# Patient Record
Sex: FEMALE | Race: WHITE | NOT HISPANIC OR LATINO | URBAN - METROPOLITAN AREA
[De-identification: names, ages, dates, MRNs, and addresses within clinical notes are randomized per-mention and may not be internally consistent; named-entity substitution may affect disease eponyms.]

---

## 2017-03-17 ENCOUNTER — GENERIC CONVERSION - ENCOUNTER (OUTPATIENT)
Dept: OTHER | Facility: OTHER | Age: 47
End: 2017-03-17

## 2017-05-15 ENCOUNTER — ALLSCRIPTS OFFICE VISIT (OUTPATIENT)
Dept: OTHER | Facility: OTHER | Age: 47
End: 2017-05-15

## 2017-06-07 ENCOUNTER — ALLSCRIPTS OFFICE VISIT (OUTPATIENT)
Dept: OTHER | Facility: OTHER | Age: 47
End: 2017-06-07

## 2017-08-15 ENCOUNTER — ALLSCRIPTS OFFICE VISIT (OUTPATIENT)
Dept: OTHER | Facility: OTHER | Age: 47
End: 2017-08-15

## 2017-12-26 ENCOUNTER — ALLSCRIPTS OFFICE VISIT (OUTPATIENT)
Dept: OTHER | Facility: OTHER | Age: 47
End: 2017-12-26

## 2018-01-12 NOTE — MISCELLANEOUS
Dear  Rj Jensen: We missed you for your originally scheduled neurological followup appointment with Dr Chava Escobedo  Please call at your earliest convenience to reschedule this appointment  Sincerely,     Paolo Donato 89           Electronically signed by: Kayla Millard   Mar 17 2017 10:03AM EST Co-author

## 2018-01-12 NOTE — MISCELLANEOUS
Dear Eileen Israel : We missed you for your originally scheduled neurological followup appointment with Dr Cameron Fischer  Please call at your earliest convenience to reschedule this appointment  Sincerely,     Fredy Burger 102           Electronically signed by: Maribel Griffith   Nov 23 2016 11:11AM EST Co-author

## 2018-01-13 NOTE — PROCEDURES
Results/Data    Procedure: Electromyogram and Nerve Conduction Study  Indication: Right Upper Extremity   Referred by Dr Dalila Huang  The procedure's were discussed with the patient  Written consent was obtained prior to the procedure and is detailed in the patient's record  Prior to the start of the procedure a time out was taken and the identity of the patient was confirmed via name and date of birth with the patient  The correct site and the procedure to be performed were confirmed  The correct side was confirmed if applicable  The positioning of the patient was verified  The availability of the correct equipment was verified  Procedure Start Time: 11:30    Technique: A sterile concentric needle electrode was used  The patient tolerated the procedure well  There were no complications  Results : Motor and sensory nerve conduction studies were performed on the median and ulnar nerves  The median motor terminal latency was within normal limits with a normal compound motor action potential amplitude and a slow conduction velocity across the wrist  The ulnar compound motor action potential was within normal limits  The median and ulnar F waves were within normal limits  The median sensory peak latency was prolonged with a normal sensory action potential amplitude  The ulnar sensory action potential was within normal limits  The median palmar evoked response was prolonged by 0 6 as compared to the ulnar palmar evoked responses same distance  Concentric needle examination was performed on various proximal and distal muscles including deltoid, biceps, triceps, pronator teres, APB, FDI and low cervical paraspinals  There was no evidence of active denervation in any of the muscles tested  Mild decreased recruitment was noted in the APB   The compound motor unit action potentials were of normal configuration with interference patterns being full or full for effort in the remaining muscles tested  Interpretation: There is electrophysiologic  evidence of a:    1  Moderate median nerve compression neuropathy at the wrist with demyelinative  changes, consistent with a diagnosis of carpal tunnel syndrome  2  There is no evidence of a cervical radiculopathy or  ulnar neuropathy  Clinical correlation is recommended        Signatures   Electronically signed by : Ravin García MD; Jun 7 2017  1:24PM EST                       (Author)

## 2018-01-14 VITALS
BODY MASS INDEX: 22.67 KG/M2 | WEIGHT: 123.19 LBS | SYSTOLIC BLOOD PRESSURE: 106 MMHG | HEIGHT: 62 IN | DIASTOLIC BLOOD PRESSURE: 77 MMHG | HEART RATE: 86 BPM

## 2018-01-14 VITALS
DIASTOLIC BLOOD PRESSURE: 84 MMHG | WEIGHT: 120 LBS | HEART RATE: 80 BPM | BODY MASS INDEX: 22.08 KG/M2 | HEIGHT: 62 IN | SYSTOLIC BLOOD PRESSURE: 122 MMHG | RESPIRATION RATE: 16 BRPM
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
## 2018-01-23 NOTE — MISCELLANEOUS
Provider Comments  Provider Comments:   2nd time patient hasn't been with us for her appointment  Tried calling pt  with current number, but that number has been disconnected  Signatures   Electronically signed by :  Zhane Hendricks MD; Dec 26 2017  1:15PM EST                       (Author)

## 2018-10-16 ENCOUNTER — INPATIENT (INPATIENT)
Facility: HOSPITAL | Age: 48
LOS: 4 days | Discharge: HOME | End: 2018-10-21
Attending: INTERNAL MEDICINE | Admitting: INTERNAL MEDICINE

## 2018-10-16 VITALS
TEMPERATURE: 96 F | HEIGHT: 62 IN | SYSTOLIC BLOOD PRESSURE: 139 MMHG | HEART RATE: 88 BPM | DIASTOLIC BLOOD PRESSURE: 82 MMHG | RESPIRATION RATE: 16 BRPM | WEIGHT: 117.07 LBS

## 2018-10-16 DIAGNOSIS — F19.10 OTHER PSYCHOACTIVE SUBSTANCE ABUSE, UNCOMPLICATED: ICD-10-CM

## 2018-10-16 DIAGNOSIS — F13.20 SEDATIVE, HYPNOTIC OR ANXIOLYTIC DEPENDENCE, UNCOMPLICATED: ICD-10-CM

## 2018-10-16 DIAGNOSIS — Z98.1 ARTHRODESIS STATUS: Chronic | ICD-10-CM

## 2018-10-16 DIAGNOSIS — F13.10 SEDATIVE, HYPNOTIC OR ANXIOLYTIC ABUSE, UNCOMPLICATED: ICD-10-CM

## 2018-10-16 DIAGNOSIS — F10.20 ALCOHOL DEPENDENCE, UNCOMPLICATED: ICD-10-CM

## 2018-10-16 DIAGNOSIS — Z98.890 OTHER SPECIFIED POSTPROCEDURAL STATES: Chronic | ICD-10-CM

## 2018-10-16 DIAGNOSIS — M54.9 DORSALGIA, UNSPECIFIED: ICD-10-CM

## 2018-10-16 DIAGNOSIS — F17.200 NICOTINE DEPENDENCE, UNSPECIFIED, UNCOMPLICATED: ICD-10-CM

## 2018-10-16 DIAGNOSIS — F41.9 ANXIETY DISORDER, UNSPECIFIED: ICD-10-CM

## 2018-10-16 DIAGNOSIS — F14.20 COCAINE DEPENDENCE, UNCOMPLICATED: ICD-10-CM

## 2018-10-16 DIAGNOSIS — F11.20 OPIOID DEPENDENCE, UNCOMPLICATED: ICD-10-CM

## 2018-10-16 LAB
ALBUMIN SERPL ELPH-MCNC: 4.2 G/DL — SIGNIFICANT CHANGE UP (ref 3.5–5.2)
ALP SERPL-CCNC: 97 U/L — SIGNIFICANT CHANGE UP (ref 30–115)
ALT FLD-CCNC: 7 U/L — SIGNIFICANT CHANGE UP (ref 0–41)
AMMONIA BLD-MCNC: 31 UMOL/L — SIGNIFICANT CHANGE UP (ref 11–55)
AMYLASE P1 CFR SERPL: 45 U/L — SIGNIFICANT CHANGE UP (ref 25–115)
ANION GAP SERPL CALC-SCNC: 16 MMOL/L — HIGH (ref 7–14)
APPEARANCE UR: CLEAR — SIGNIFICANT CHANGE UP
AST SERPL-CCNC: 9 U/L — SIGNIFICANT CHANGE UP (ref 0–41)
BACTERIA # UR AUTO: ABNORMAL
BASOPHILS # BLD AUTO: 0.09 K/UL — SIGNIFICANT CHANGE UP (ref 0–0.2)
BASOPHILS NFR BLD AUTO: 0.9 % — SIGNIFICANT CHANGE UP (ref 0–1)
BILIRUB SERPL-MCNC: <0.2 MG/DL — SIGNIFICANT CHANGE UP (ref 0.2–1.2)
BILIRUB UR-MCNC: NEGATIVE — SIGNIFICANT CHANGE UP
BUN SERPL-MCNC: 12 MG/DL — SIGNIFICANT CHANGE UP (ref 10–20)
CALCIUM SERPL-MCNC: 9.1 MG/DL — SIGNIFICANT CHANGE UP (ref 8.5–10.1)
CHLORIDE SERPL-SCNC: 102 MMOL/L — SIGNIFICANT CHANGE UP (ref 98–110)
CHOLEST SERPL-MCNC: 157 MG/DL — SIGNIFICANT CHANGE UP (ref 100–200)
CO2 SERPL-SCNC: 26 MMOL/L — SIGNIFICANT CHANGE UP (ref 17–32)
COD CRY URNS QL: NEGATIVE — SIGNIFICANT CHANGE UP
COLOR SPEC: YELLOW — SIGNIFICANT CHANGE UP
CREAT SERPL-MCNC: 1 MG/DL — SIGNIFICANT CHANGE UP (ref 0.7–1.5)
DIFF PNL FLD: ABNORMAL
EOSINOPHIL # BLD AUTO: 0.15 K/UL — SIGNIFICANT CHANGE UP (ref 0–0.7)
EOSINOPHIL NFR BLD AUTO: 1.6 % — SIGNIFICANT CHANGE UP (ref 0–8)
EPI CELLS # UR: ABNORMAL /HPF
ETHANOL SERPL-MCNC: 13 MG/DL — HIGH
GGT SERPL-CCNC: 22 U/L — SIGNIFICANT CHANGE UP (ref 1–40)
GLUCOSE SERPL-MCNC: 122 MG/DL — HIGH (ref 70–99)
GLUCOSE UR QL: NEGATIVE MG/DL — SIGNIFICANT CHANGE UP
GRAN CASTS # UR COMP ASSIST: NEGATIVE — SIGNIFICANT CHANGE UP
HCG UR QL: NEGATIVE — SIGNIFICANT CHANGE UP
HCT VFR BLD CALC: 39.1 % — SIGNIFICANT CHANGE UP (ref 37–47)
HDLC SERPL-MCNC: 35 MG/DL — LOW
HGB BLD-MCNC: 13.2 G/DL — SIGNIFICANT CHANGE UP (ref 12–16)
HYALINE CASTS # UR AUTO: NEGATIVE — SIGNIFICANT CHANGE UP
IMM GRANULOCYTES NFR BLD AUTO: 0.3 % — SIGNIFICANT CHANGE UP (ref 0.1–0.3)
KETONES UR-MCNC: NEGATIVE — SIGNIFICANT CHANGE UP
LEUKOCYTE ESTERASE UR-ACNC: ABNORMAL
LIPID PNL WITH DIRECT LDL SERPL: 99 MG/DL — SIGNIFICANT CHANGE UP (ref 4–129)
LYMPHOCYTES # BLD AUTO: 4.02 K/UL — HIGH (ref 1.2–3.4)
LYMPHOCYTES # BLD AUTO: 42.2 % — SIGNIFICANT CHANGE UP (ref 20.5–51.1)
MAGNESIUM SERPL-MCNC: 2.2 MG/DL — SIGNIFICANT CHANGE UP (ref 1.8–2.4)
MCHC RBC-ENTMCNC: 30.8 PG — SIGNIFICANT CHANGE UP (ref 27–31)
MCHC RBC-ENTMCNC: 33.8 G/DL — SIGNIFICANT CHANGE UP (ref 32–37)
MCV RBC AUTO: 91.1 FL — SIGNIFICANT CHANGE UP (ref 81–99)
MONOCYTES # BLD AUTO: 0.47 K/UL — SIGNIFICANT CHANGE UP (ref 0.1–0.6)
MONOCYTES NFR BLD AUTO: 4.9 % — SIGNIFICANT CHANGE UP (ref 1.7–9.3)
NEUTROPHILS # BLD AUTO: 4.77 K/UL — SIGNIFICANT CHANGE UP (ref 1.4–6.5)
NEUTROPHILS NFR BLD AUTO: 50.1 % — SIGNIFICANT CHANGE UP (ref 42.2–75.2)
NITRITE UR-MCNC: NEGATIVE — SIGNIFICANT CHANGE UP
NRBC # BLD: 0 /100 WBCS — SIGNIFICANT CHANGE UP (ref 0–0)
PH UR: 7 — SIGNIFICANT CHANGE UP (ref 5–8)
PLAT MORPH BLD: NORMAL — SIGNIFICANT CHANGE UP
PLATELET # BLD AUTO: 394 K/UL — SIGNIFICANT CHANGE UP (ref 130–400)
POTASSIUM SERPL-MCNC: 4.1 MMOL/L — SIGNIFICANT CHANGE UP (ref 3.5–5)
POTASSIUM SERPL-SCNC: 4.1 MMOL/L — SIGNIFICANT CHANGE UP (ref 3.5–5)
PROT SERPL-MCNC: 6.7 G/DL — SIGNIFICANT CHANGE UP (ref 6–8)
PROT UR-MCNC: NEGATIVE MG/DL — SIGNIFICANT CHANGE UP
RBC # BLD: 4.29 M/UL — SIGNIFICANT CHANGE UP (ref 4.2–5.4)
RBC # FLD: 13.2 % — SIGNIFICANT CHANGE UP (ref 11.5–14.5)
RBC BLD AUTO: NORMAL — SIGNIFICANT CHANGE UP
RBC CASTS # UR COMP ASSIST: ABNORMAL /HPF
SODIUM SERPL-SCNC: 144 MMOL/L — SIGNIFICANT CHANGE UP (ref 135–146)
SP GR SPEC: 1.02 — SIGNIFICANT CHANGE UP (ref 1.01–1.03)
TOTAL CHOLESTEROL/HDL RATIO MEASUREMENT: 4.5 RATIO — SIGNIFICANT CHANGE UP (ref 4–5.5)
TRI-PHOS CRY UR QL COMP ASSIST: NEGATIVE — SIGNIFICANT CHANGE UP
TRIGL SERPL-MCNC: 161 MG/DL — HIGH (ref 10–149)
URATE CRY FLD QL MICRO: NEGATIVE — SIGNIFICANT CHANGE UP
UROBILINOGEN FLD QL: 0.2 MG/DL — SIGNIFICANT CHANGE UP (ref 0.2–0.2)
WBC # BLD: 9.53 K/UL — SIGNIFICANT CHANGE UP (ref 4.8–10.8)
WBC # FLD AUTO: 9.53 K/UL — SIGNIFICANT CHANGE UP (ref 4.8–10.8)
WBC UR QL: SIGNIFICANT CHANGE UP /HPF

## 2018-10-16 RX ORDER — METHOCARBAMOL 500 MG/1
500 TABLET, FILM COATED ORAL EVERY 6 HOURS
Qty: 0 | Refills: 0 | Status: DISCONTINUED | OUTPATIENT
Start: 2018-10-16 | End: 2018-10-21

## 2018-10-16 RX ORDER — MAGNESIUM HYDROXIDE 400 MG/1
30 TABLET, CHEWABLE ORAL ONCE
Qty: 0 | Refills: 0 | Status: DISCONTINUED | OUTPATIENT
Start: 2018-10-16 | End: 2018-10-21

## 2018-10-16 RX ORDER — PHENOBARBITAL 60 MG
TABLET ORAL
Qty: 0 | Refills: 0 | Status: COMPLETED | OUTPATIENT
Start: 2018-10-16 | End: 2018-10-21

## 2018-10-16 RX ORDER — IBUPROFEN 200 MG
600 TABLET ORAL EVERY 6 HOURS
Qty: 0 | Refills: 0 | Status: DISCONTINUED | OUTPATIENT
Start: 2018-10-16 | End: 2018-10-21

## 2018-10-16 RX ORDER — FOLIC ACID 0.8 MG
1 TABLET ORAL DAILY
Qty: 0 | Refills: 0 | Status: DISCONTINUED | OUTPATIENT
Start: 2018-10-16 | End: 2018-10-21

## 2018-10-16 RX ORDER — THIAMINE MONONITRATE (VIT B1) 100 MG
100 TABLET ORAL DAILY
Qty: 0 | Refills: 0 | Status: COMPLETED | OUTPATIENT
Start: 2018-10-16 | End: 2018-10-19

## 2018-10-16 RX ORDER — PHENOBARBITAL 60 MG
32.4 TABLET ORAL EVERY 4 HOURS
Qty: 0 | Refills: 0 | Status: DISCONTINUED | OUTPATIENT
Start: 2018-10-16 | End: 2018-10-21

## 2018-10-16 RX ORDER — ACETAMINOPHEN 500 MG
650 TABLET ORAL EVERY 4 HOURS
Qty: 0 | Refills: 0 | Status: DISCONTINUED | OUTPATIENT
Start: 2018-10-16 | End: 2018-10-21

## 2018-10-16 RX ORDER — PHENOBARBITAL 60 MG
32.4 TABLET ORAL EVERY 6 HOURS
Qty: 0 | Refills: 0 | Status: DISCONTINUED | OUTPATIENT
Start: 2018-10-18 | End: 2018-10-19

## 2018-10-16 RX ORDER — MULTIVIT-MIN/FERROUS GLUCONATE 9 MG/15 ML
1 LIQUID (ML) ORAL DAILY
Qty: 0 | Refills: 0 | Status: DISCONTINUED | OUTPATIENT
Start: 2018-10-16 | End: 2018-10-21

## 2018-10-16 RX ORDER — PHENOBARBITAL 60 MG
48.6 TABLET ORAL EVERY 6 HOURS
Qty: 0 | Refills: 0 | Status: DISCONTINUED | OUTPATIENT
Start: 2018-10-17 | End: 2018-10-17

## 2018-10-16 RX ORDER — PHENOBARBITAL 60 MG
32.4 TABLET ORAL EVERY 12 HOURS
Qty: 0 | Refills: 0 | Status: DISCONTINUED | OUTPATIENT
Start: 2018-10-20 | End: 2018-10-21

## 2018-10-16 RX ORDER — PSEUDOEPHEDRINE HCL 30 MG
60 TABLET ORAL EVERY 6 HOURS
Qty: 0 | Refills: 0 | Status: DISCONTINUED | OUTPATIENT
Start: 2018-10-16 | End: 2018-10-21

## 2018-10-16 RX ORDER — TRAZODONE HCL 50 MG
100 TABLET ORAL AT BEDTIME
Qty: 0 | Refills: 0 | Status: DISCONTINUED | OUTPATIENT
Start: 2018-10-16 | End: 2018-10-21

## 2018-10-16 RX ORDER — HYDROXYZINE HCL 10 MG
50 TABLET ORAL EVERY 6 HOURS
Qty: 0 | Refills: 0 | Status: DISCONTINUED | OUTPATIENT
Start: 2018-10-16 | End: 2018-10-21

## 2018-10-16 RX ORDER — PHENOBARBITAL 60 MG
64.8 TABLET ORAL EVERY 6 HOURS
Qty: 0 | Refills: 0 | Status: DISCONTINUED | OUTPATIENT
Start: 2018-10-16 | End: 2018-10-16

## 2018-10-16 RX ORDER — NICOTINE POLACRILEX 2 MG
1 GUM BUCCAL DAILY
Qty: 0 | Refills: 0 | Status: DISCONTINUED | OUTPATIENT
Start: 2018-10-16 | End: 2018-10-21

## 2018-10-16 RX ADMIN — Medication 64.8 MILLIGRAM(S): at 18:42

## 2018-10-16 RX ADMIN — Medication 64.8 MILLIGRAM(S): at 23:03

## 2018-10-16 NOTE — H&P ADULT - HISTORY OF PRESENT ILLNESS
47y Female presents for detox with continuous Opioid use disorder & Benzodiazepine use disorder. Patient reports she was cleaned for 11 years since , until she sister  from cancer and she was so upset and triggered her to go back to alcohol and cocaine abuse. Pt also bought street Xanax 3x weekly. Denies h/o seizure. Pt reports having "delusion" in the past 2 weeks, seeing auras and souls. Denies h/o other episodes of AVH. Pt says it only happened in the past 2 weeks.  Patient c/o feeling anxiety, insomnia, poor appetite, hot and chills intermittently and tremors. Chronic lower back pain with pain score 8 out 10.Patient A&Ox3, denies cp, sob, headache, dizziness, bleeding and dysuria.  LMP: 3 months ago, Papsmear in  was normal, Mammography: Never  Patient admits to abusing current substances as follows:  DRUG	AGE OF ONSET (Y.O)	ROUTE	FREQ	AMOUNT	LAST USE	LENGTH OF CURRENT USE	  ETOH	17	PO	Daily	6 beers + 2 airplane bottle size of liquor	10/16/18 24 oz + 2 shots	1 year	  Xanax  	31	PO	3x /week	4mg	10/16/18 4mg	3 month	  Crack	22	IN/SMOKING	Daily	$40	1 week ago	1 year	  Heroin	47	IN	One time	1 line	1 week ago	One time	  THC	13	Smoking	Daily	4 joints	10/16/18 4 joints		  Crytal meth	47	Smoking	3 times only	?	10/13/18	3 times	  Denies other drugs abuse							  Last Detox:  at [facility] Withdrawal Seizures: Denied  Psyhx: Anxiety and depression, Denies h/o suicidal attempt or thought. Pt [facility]  due to "emotional breakdown", H/O visual hallucination in the past 2 weeks, "I saw rainbow and soul". Denies auditory hallucination. pt reports possible from drug/etoh induced.   Is patient currently receiving methadone from an MMTP: No  Screening history	Last tested	Result	History of treatment	  HIV	? years	NEG	N/A	  Hepatitis C	//	NEG	N/A	  Quantiferon GOLD TB test		NEG	N/A	    Immunization	Not Received	Unknown	Received	Date Received 	  Influenza	v				  Pneumococcus	v				  Tetanus			v		  Others					  					  I-Stop:  Patient Name:	QUINTON ADAMS	YOB: 1970	  Address:	[address] [place] [id number]	Sex:	Female	  Rx Written	Rx Dispensed	Drug	Strength	Quantity	Days Supply	Prescriber Name	  2016	[id number]-[id number] -40		40.0	20	MD GRIFFIN, MARTIN	  2016	ZGFNFL-[id number] -40		40.0	20	MD GRIFFIN, MARTIN

## 2018-10-16 NOTE — H&P ADULT - ASSESSMENT
47y Female presents for detox with continuous Opioid use disorder & Benzodiazepine use disorder. Patient reports she was cleaned for 11 years since , until she sister  from cancer and she was so upset and triggered her to go back to alcohol and cocaine abuse. Pt also bought street Xanax 3x weekly. Denies h/o seizure. Pt reports having "delusion" in the past 2 weeks, seeing auras and souls. Denies h/o other episodes of AVH. Pt says it only happened in the past 2 weeks.

## 2018-10-16 NOTE — H&P ADULT - FAMILY HISTORY
Sibling  Still living? No  Family history of lung cancer, Age at diagnosis: Age Unknown     Mother  Still living? No  HTN (hypertension), Age at diagnosis: Age Unknown     Father  Still living? No  Family history of hypertension, Age at diagnosis: Age Unknown

## 2018-10-16 NOTE — H&P ADULT - PROBLEM SELECTOR PLAN 6
observation  Atarax 50mg PO Q6H PRN for anxiety  trazodone 100mg PO QHS PRN for insomnia  psych consult

## 2018-10-16 NOTE — H&P ADULT - ATTENDING COMMENTS
Patient interviewed and examined.    Chart reviewed.    PA's H&P noted and modified, as appropriate.    Case discussed on team rounds    Following is my summary of the case.    Admitted for detox: from ____ED, x___Intake, ____Med/Surg Floor    Alcohol__x__   Opioid_____  Benzo_x__ Other_____    Substance amount, duration of use, last usage, and prior attempts at detox or rehabs, are outlined above in the H&P and discussed with patient.    Associated withdrawal symptoms presents.  Comorbid conditions noted. Chronic and Stable.    Past Medical Hx, Psych Hx, family Hx, Social Hx from H&P reviewed and NO changes.  MDD(+) YUMIKO(+)_    Old medical record and medication Hx. Reviewed    Following items reviewed and addressed:  1. labs  2. EKG  3. Imaging from PACs module    Examination: no change from PA's exam.    Place on following protocol  _____Medically Managed  __X__Medically Supervised    Ciwa_____Librium taper____Ativan taper___Methadone taper___ Phenobarb taper_x___ Suboxone Induction____MMTP____    Narcan Kit Offered    Psych Consult ____N/A  _X__Ordered    Physical Therapy  ___X N/A   ___  Ordered    Aftercare disposition to be addressed by counselors.    Estimated length of stay 3-5 days.

## 2018-10-16 NOTE — H&P ADULT - NSHPPHYSICALEXAM_GEN_ALL_CORE
-  Vital Signs:      Temp: 98.2      Pulse: 100        RR: 14       BP:  90/70    Physical Exam:              Constitutional: +Anxious, appear depressed,  A&Ox3, W/N and W/D.  HEENT: NC/AT, PERRLA, EOM Intact, Nares normal, No Sinus tenderness.  Lips, mucosa and tongue normal; Neck supple, No adenopathy  Respiratory: CTAB, no rales, no rhonchi, no wheezes  Cardiovascular: +S1S2, No M/R/G  Gastrointestinal: +BS, soft, non-tender, not distended, No CVAT  Extremities: Atraumatic, no cyanosis, no edema, no calf tenderness,  Vascular: +dorsal pedis and radial pulses, no extremity cyanosis  Neurological: sensation intact, ROM equal B/L, CN II-XII intact, Gait: steady  Skin: no rashes, no lesions, normal turgor, No track marks  No Decubiti present  No IV lines present  Rectal/Breasts Exam: Deferred

## 2018-10-17 DIAGNOSIS — F13.20 SEDATIVE, HYPNOTIC OR ANXIOLYTIC DEPENDENCE, UNCOMPLICATED: ICD-10-CM

## 2018-10-17 DIAGNOSIS — F10.20 ALCOHOL DEPENDENCE, UNCOMPLICATED: ICD-10-CM

## 2018-10-17 LAB
AMPHET UR-MCNC: POSITIVE
BARBITURATES UR SCN-MCNC: NEGATIVE — SIGNIFICANT CHANGE UP
BENZODIAZ UR-MCNC: POSITIVE
COCAINE METAB.OTHER UR-MCNC: NEGATIVE — SIGNIFICANT CHANGE UP
ESTIMATED AVERAGE GLUCOSE: 123 MG/DL — HIGH (ref 68–114)
HAV IGM SER-ACNC: SIGNIFICANT CHANGE UP
HBA1C BLD-MCNC: 5.9 % — HIGH (ref 4–5.6)
HBV CORE IGM SER-ACNC: SIGNIFICANT CHANGE UP
HBV SURFACE AG SER-ACNC: SIGNIFICANT CHANGE UP
HCV AB S/CO SERPL IA: 0.18 S/CO — SIGNIFICANT CHANGE UP
HCV AB SERPL-IMP: SIGNIFICANT CHANGE UP
HIV 1+2 AB+HIV1 P24 AG SERPL QL IA: SIGNIFICANT CHANGE UP
METHADONE UR-MCNC: NEGATIVE — SIGNIFICANT CHANGE UP
OPIATES UR-MCNC: NEGATIVE — SIGNIFICANT CHANGE UP
OXYCODONE UR-MCNC: NEGATIVE — SIGNIFICANT CHANGE UP
PCP SPEC-MCNC: SIGNIFICANT CHANGE UP
PROPOXYPHENE QUALITATIVE URINE RESULT: NEGATIVE — SIGNIFICANT CHANGE UP
T PALLIDUM AB TITR SER: NEGATIVE — SIGNIFICANT CHANGE UP

## 2018-10-17 RX ORDER — PANTOPRAZOLE SODIUM 20 MG/1
40 TABLET, DELAYED RELEASE ORAL
Qty: 0 | Refills: 0 | Status: DISCONTINUED | OUTPATIENT
Start: 2018-10-17 | End: 2018-10-21

## 2018-10-17 RX ORDER — SERTRALINE 25 MG/1
50 TABLET, FILM COATED ORAL DAILY
Qty: 0 | Refills: 0 | Status: DISCONTINUED | OUTPATIENT
Start: 2018-10-17 | End: 2018-10-19

## 2018-10-17 RX ADMIN — PANTOPRAZOLE SODIUM 40 MILLIGRAM(S): 20 TABLET, DELAYED RELEASE ORAL at 11:44

## 2018-10-17 RX ADMIN — Medication 1 TABLET(S): at 09:01

## 2018-10-17 RX ADMIN — Medication 48.6 MILLIGRAM(S): at 17:29

## 2018-10-17 RX ADMIN — SERTRALINE 50 MILLIGRAM(S): 25 TABLET, FILM COATED ORAL at 11:47

## 2018-10-17 RX ADMIN — Medication 48.6 MILLIGRAM(S): at 06:27

## 2018-10-17 RX ADMIN — Medication 1 MILLIGRAM(S): at 09:01

## 2018-10-17 RX ADMIN — Medication 1 PATCH: at 09:01

## 2018-10-17 RX ADMIN — Medication 100 MILLIGRAM(S): at 09:01

## 2018-10-17 RX ADMIN — Medication 48.6 MILLIGRAM(S): at 11:43

## 2018-10-17 NOTE — BEHAVIORAL HEALTH ASSESSMENT NOTE - NSBHSOCIALHXDETAILSFT_PSY_A_CORE
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

## 2018-10-17 NOTE — BEHAVIORAL HEALTH ASSESSMENT NOTE - SUMMARY
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 Pt reports depressive sx recently, grief from losses in past year. Pt reports hallucinations and delusions but has insight that this was due to substance use and sleep deprivation and even laughs about it during assessment. Pt denies SI and agrees to start SSRI.

## 2018-10-17 NOTE — BEHAVIORAL HEALTH ASSESSMENT NOTE - LEGAL HISTORY
unsure of charge but is 4 count felony no details provided, drug possession charge, assaulting EMT charge
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
## 2018-10-18 LAB
GAMMA INTERFERON BACKGROUND BLD IA-ACNC: 0.02 IU/ML — SIGNIFICANT CHANGE UP
M TB IFN-G BLD-IMP: NEGATIVE — SIGNIFICANT CHANGE UP
M TB IFN-G CD4+ BCKGRND COR BLD-ACNC: 0 IU/ML — SIGNIFICANT CHANGE UP
M TB IFN-G CD4+CD8+ BCKGRND COR BLD-ACNC: 0 IU/ML — SIGNIFICANT CHANGE UP
QUANT TB PLUS MITOGEN MINUS NIL: >10 IU/ML — SIGNIFICANT CHANGE UP

## 2018-10-18 RX ADMIN — Medication 32.4 MILLIGRAM(S): at 11:07

## 2018-10-18 RX ADMIN — Medication 1 PATCH: at 11:06

## 2018-10-18 RX ADMIN — Medication 1 PATCH: at 11:02

## 2018-10-18 RX ADMIN — SERTRALINE 50 MILLIGRAM(S): 25 TABLET, FILM COATED ORAL at 11:02

## 2018-10-18 RX ADMIN — Medication 32.4 MILLIGRAM(S): at 18:38

## 2018-10-18 RX ADMIN — PANTOPRAZOLE SODIUM 40 MILLIGRAM(S): 20 TABLET, DELAYED RELEASE ORAL at 06:22

## 2018-10-18 RX ADMIN — Medication 1 PATCH: at 06:29

## 2018-10-18 RX ADMIN — Medication 32.4 MILLIGRAM(S): at 06:23

## 2018-10-19 RX ORDER — SERTRALINE 25 MG/1
100 TABLET, FILM COATED ORAL DAILY
Qty: 0 | Refills: 0 | Status: COMPLETED | OUTPATIENT
Start: 2018-10-20 | End: 2018-10-21

## 2018-10-19 RX ADMIN — Medication 1 PATCH: at 08:48

## 2018-10-19 RX ADMIN — Medication 1 PATCH: at 08:43

## 2018-10-19 RX ADMIN — Medication 32.4 MILLIGRAM(S): at 12:44

## 2018-10-19 RX ADMIN — Medication 32.4 MILLIGRAM(S): at 18:10

## 2018-10-19 RX ADMIN — Medication 1 PATCH: at 08:47

## 2018-10-19 RX ADMIN — Medication 32.4 MILLIGRAM(S): at 06:14

## 2018-10-19 RX ADMIN — METHOCARBAMOL 500 MILLIGRAM(S): 500 TABLET, FILM COATED ORAL at 16:15

## 2018-10-19 RX ADMIN — SERTRALINE 50 MILLIGRAM(S): 25 TABLET, FILM COATED ORAL at 08:43

## 2018-10-19 RX ADMIN — PANTOPRAZOLE SODIUM 40 MILLIGRAM(S): 20 TABLET, DELAYED RELEASE ORAL at 06:15

## 2018-10-19 RX ADMIN — Medication 32.4 MILLIGRAM(S): at 00:24

## 2018-10-19 NOTE — CHART NOTE - NSCHARTNOTEFT_GEN_A_CORE
Called by RN who spoke with attending:  Patient to receive 50mg Zoloft today then 100mg 10/20 and 150mg 10/21.  orders placed in SCM for 10/20 and 10/21

## 2018-10-19 NOTE — CHART NOTE - NSCHARTNOTEFT_GEN_A_CORE
Subsequent Inpatient Encounter                                       Detox Unit    QUINTON ADAMS   47y   Female      Chief Complaint:    Follow up for Polysubstance  Dependency    HPI:     I reviewed previous notes. No Change, except if noted below.             Detail:_    ROS:   I reviewed with patient.  No changes from previous notes except if noted below.             Detail: _    PFSH I reviewed with patient. No changes from previous notes except if noted below.             Detail_    Medication reconciliation performed.    MEDICATIONS  (STANDING):  folic acid 1 milliGRAM(s) Oral daily  multivitamin/minerals 1 Tablet(s) Oral daily  nicotine - 21 mG/24Hr(s) Patch 1 Patch Transdermal daily  pantoprazole    Tablet 40 milliGRAM(s) Oral before breakfast  PHENobarbital 32.4 milliGRAM(s) Oral every 6 hours  PHENobarbital   Oral   sertraline 50 milliGRAM(s) Oral daily  thiamine 100 milliGRAM(s) Oral daily      MEDICATIONS  (PRN):  acetaminophen   Tablet .. 650 milliGRAM(s) Oral every 4 hours PRN Temp greater or equal to 38C (100.4F), Mild Pain (1 - 3)  aluminum hydroxide/magnesium hydroxide/simethicone Suspension 30 milliLiter(s) Oral every 6 hours PRN Heartburn  bismuth subsalicylate Liquid 30 milliLiter(s) Oral every 6 hours PRN Diarrhea  cloNIDine 0.1 milliGRAM(s) Oral every 8 hours PRN Blood Pressure GREATER THAN 140/90 mmHG  cloNIDine 0.1 milliGRAM(s) Oral every 8 hours PRN opiate withdrawal  guaiFENesin/dextromethorphan  Syrup 5 milliLiter(s) Oral every 4 hours PRN Cough  hydrOXYzine hydrochloride 50 milliGRAM(s) Oral every 6 hours PRN Anxiety  ibuprofen  Tablet. 600 milliGRAM(s) Oral every 6 hours PRN Mild Pain (1 - 3)  magnesium hydroxide Suspension 30 milliLiter(s) Oral once PRN Constipation  methocarbamol 500 milliGRAM(s) Oral every 6 hours PRN muscle pain  PHENobarbital 32.4 milliGRAM(s) Oral every 4 hours PRN Withdrawal  pseudoephedrine 60 milliGRAM(s) Oral every 6 hours PRN Rhinitis  traZODone 100 milliGRAM(s) Oral at bedtime PRN insomnia  trimethobenzamide 300 milliGRAM(s) Oral every 6 hours PRN Nausea and/or Vomiting  trimethobenzamide Injectable 200 milliGRAM(s) IntraMuscular every 6 hours PRN Nausea and/or Vomiting      T(C): 35.7 (10-19-18 @ 06:00), Max: 36.2 (10-18-18 @ 18:00)  HR: 70 (10-19-18 @ 06:00) (70 - 103)  BP: 112/67 (10-19-18 @ 06:00) (112/67 - 126/78)  RR: 14 (10-19-18 @ 06:00) (14 - 16)  SpO2: --    PHYSICAL EXAM:      Constitutional: NAD, A&O x3    Eyes: PERRLA, no conjuctivitis    Neck: no lymphadenopathy    Respiratory: +air entry, no rales, no rhonchi, no wheezes    Cardiovascular: +S1 and S2, regular rate and rhythm    Gastrointestinal: +BS, soft, non-tender, not distended    Extremities:  no edema, no calf tenderness    Skin: no rashes, normal turgor            Magnesium, Serum: 2.2 mg/dL (10-16-18 @ 19:26)  Ammonia, Serum: 31 umol/L (10-16-18 @ 19:26)  Amylase, Serum Total: 45 U/L (10-16-18 @ 19:26)  Hemoglobin A1C, Whole Blood: 5.9 % (10-16-18 @ 19:26)  Treponema Pallidum Antibody Interpretation: Negative (10-16-18 @ 19:26)  Hepatitis B Surface Antigen: Nonreact (10-16-18 @ 19:26)  Hepatitis C Virus S/CO Ratio: 0.18 S/CO (10-16-18 @ 19:26)    Hepatitis C Virus Interpretation: Nonreact (10-16-18 @ 19:26)            Impression and Plan:    Primary Diagnosis:  Benzo/EtOH Dependency                                Medication: Pheno Protocol    Secondary Diagnosis: MDD                                                          Medication: c/w Zoloft; Psych FU    Tertiary Diagnosis:                                                                                     Medication:      Continue Detox Protocols. Use of PRNS as needed for withdrawal and comfort.    Adjustments to protocols:    Labs/ Tests reviewed.    Tests ordered:     Likely Disposition: ___Home       ___Rehab       ___Outpatient Program    ___Self Help     _____Other    Estimated Length of stay:__5__

## 2018-10-20 RX ORDER — SERTRALINE 25 MG/1
1 TABLET, FILM COATED ORAL
Qty: 0 | Refills: 0 | COMMUNITY
Start: 2018-10-20

## 2018-10-20 RX ORDER — SERTRALINE 25 MG/1
100 TABLET, FILM COATED ORAL DAILY
Qty: 0 | Refills: 0 | Status: DISCONTINUED | OUTPATIENT
Start: 2018-10-20 | End: 2018-10-21

## 2018-10-20 RX ADMIN — Medication 32.4 MILLIGRAM(S): at 17:25

## 2018-10-20 RX ADMIN — Medication 100 MILLIGRAM(S): at 00:25

## 2018-10-20 RX ADMIN — METHOCARBAMOL 500 MILLIGRAM(S): 500 TABLET, FILM COATED ORAL at 21:04

## 2018-10-20 RX ADMIN — Medication 1 PATCH: at 20:22

## 2018-10-20 RX ADMIN — Medication 1 PATCH: at 08:59

## 2018-10-20 RX ADMIN — METHOCARBAMOL 500 MILLIGRAM(S): 500 TABLET, FILM COATED ORAL at 00:25

## 2018-10-20 RX ADMIN — Medication 100 MILLIGRAM(S): at 23:23

## 2018-10-20 RX ADMIN — SERTRALINE 100 MILLIGRAM(S): 25 TABLET, FILM COATED ORAL at 11:09

## 2018-10-20 RX ADMIN — PANTOPRAZOLE SODIUM 40 MILLIGRAM(S): 20 TABLET, DELAYED RELEASE ORAL at 06:15

## 2018-10-20 RX ADMIN — Medication 32.4 MILLIGRAM(S): at 05:53

## 2018-10-20 RX ADMIN — METHOCARBAMOL 500 MILLIGRAM(S): 500 TABLET, FILM COATED ORAL at 14:56

## 2018-10-20 RX ADMIN — Medication 1 PATCH: at 16:49

## 2018-10-20 RX ADMIN — Medication 50 MILLIGRAM(S): at 23:23

## 2018-10-20 NOTE — CHART NOTE - NSCHARTNOTEFT_GEN_A_CORE
Subsequent Inpatient Encounter                                       Detox Unit    QUINTON ADAMS   47y   Female      Chief Complaint:    Follow up for Polysubstance  Dependency    HPI:     I reviewed previous notes. No Change, except if noted below.             Detail:_    ROS:   I reviewed with patient.  No changes from previous notes except if noted below.             Detail: _    PFSH I reviewed with patient. No changes from previous notes except if noted below.             Detail_    Medication reconciliation performed.    MEDICATIONS  (STANDING):  folic acid 1 milliGRAM(s) Oral daily  multivitamin/minerals 1 Tablet(s) Oral daily  nicotine - 21 mG/24Hr(s) Patch 1 Patch Transdermal daily  pantoprazole    Tablet 40 milliGRAM(s) Oral before breakfast  PHENobarbital 32.4 milliGRAM(s) Oral every 6 hours  PHENobarbital   Oral   sertraline 50 milliGRAM(s) Oral daily  thiamine 100 milliGRAM(s) Oral daily      MEDICATIONS  (PRN):  acetaminophen   Tablet .. 650 milliGRAM(s) Oral every 4 hours PRN Temp greater or equal to 38C (100.4F), Mild Pain (1 - 3)  aluminum hydroxide/magnesium hydroxide/simethicone Suspension 30 milliLiter(s) Oral every 6 hours PRN Heartburn  bismuth subsalicylate Liquid 30 milliLiter(s) Oral every 6 hours PRN Diarrhea  cloNIDine 0.1 milliGRAM(s) Oral every 8 hours PRN Blood Pressure GREATER THAN 140/90 mmHG  cloNIDine 0.1 milliGRAM(s) Oral every 8 hours PRN opiate withdrawal  guaiFENesin/dextromethorphan  Syrup 5 milliLiter(s) Oral every 4 hours PRN Cough  hydrOXYzine hydrochloride 50 milliGRAM(s) Oral every 6 hours PRN Anxiety  ibuprofen  Tablet. 600 milliGRAM(s) Oral every 6 hours PRN Mild Pain (1 - 3)  magnesium hydroxide Suspension 30 milliLiter(s) Oral once PRN Constipation  methocarbamol 500 milliGRAM(s) Oral every 6 hours PRN muscle pain  PHENobarbital 32.4 milliGRAM(s) Oral every 4 hours PRN Withdrawal  pseudoephedrine 60 milliGRAM(s) Oral every 6 hours PRN Rhinitis  traZODone 100 milliGRAM(s) Oral at bedtime PRN insomnia  trimethobenzamide 300 milliGRAM(s) Oral every 6 hours PRN Nausea and/or Vomiting  trimethobenzamide Injectable 200 milliGRAM(s) IntraMuscular every 6 hours PRN Nausea and/or Vomiting      T(C): 35.7 (10-19-18 @ 06:00), Max: 36.2 (10-18-18 @ 18:00)  HR: 70 (10-19-18 @ 06:00) (70 - 103)  BP: 112/67 (10-19-18 @ 06:00) (112/67 - 126/78)  RR: 14 (10-19-18 @ 06:00) (14 - 16)  SpO2: --    PHYSICAL EXAM:      Constitutional: NAD, A&O x3    Eyes: PERRLA, no conjuctivitis    Neck: no lymphadenopathy    Respiratory: +air entry, no rales, no rhonchi, no wheezes    Cardiovascular: +S1 and S2, regular rate and rhythm    Gastrointestinal: +BS, soft, non-tender, not distended    Extremities:  no edema, no calf tenderness    Skin: no rashes, normal turgor            Magnesium, Serum: 2.2 mg/dL (10-16-18 @ 19:26)  Ammonia, Serum: 31 umol/L (10-16-18 @ 19:26)  Amylase, Serum Total: 45 U/L (10-16-18 @ 19:26)  Hemoglobin A1C, Whole Blood: 5.9 % (10-16-18 @ 19:26)  Treponema Pallidum Antibody Interpretation: Negative (10-16-18 @ 19:26)  Hepatitis B Surface Antigen: Nonreact (10-16-18 @ 19:26)  Hepatitis C Virus S/CO Ratio: 0.18 S/CO (10-16-18 @ 19:26)    Hepatitis C Virus Interpretation: Nonreact (10-16-18 @ 19:26)            Impression and Plan:    Primary Diagnosis:  Benzo/EtOH Dependency                                Medication: Pheno Protocol    Secondary Diagnosis: MDD                                                          Medication: c/w Zoloft; Psych FU    Tertiary Diagnosis:                                                                                     Medication:      Continue Detox Protocols. Use of PRNS as needed for withdrawal and comfort.    Adjustments to protocols:    Labs/ Tests reviewed.    Tests ordered:     Likely Disposition: __X_Home       ___Rehab       ___Outpatient Program    ___Self Help     _____Other    Estimated Length of stay:__5__

## 2018-10-21 ENCOUNTER — INPATIENT (INPATIENT)
Facility: HOSPITAL | Age: 48
LOS: 25 days | Discharge: HOME | End: 2018-11-16
Attending: INTERNAL MEDICINE | Admitting: INTERNAL MEDICINE

## 2018-10-21 VITALS
HEART RATE: 59 BPM | RESPIRATION RATE: 16 BRPM | SYSTOLIC BLOOD PRESSURE: 121 MMHG | DIASTOLIC BLOOD PRESSURE: 62 MMHG | TEMPERATURE: 96 F

## 2018-10-21 VITALS
TEMPERATURE: 96 F | RESPIRATION RATE: 16 BRPM | SYSTOLIC BLOOD PRESSURE: 108 MMHG | HEART RATE: 97 BPM | HEIGHT: 62 IN | WEIGHT: 115.96 LBS | DIASTOLIC BLOOD PRESSURE: 76 MMHG

## 2018-10-21 DIAGNOSIS — Z98.890 OTHER SPECIFIED POSTPROCEDURAL STATES: Chronic | ICD-10-CM

## 2018-10-21 DIAGNOSIS — Z98.1 ARTHRODESIS STATUS: Chronic | ICD-10-CM

## 2018-10-21 PROBLEM — M54.9 DORSALGIA, UNSPECIFIED: Chronic | Status: ACTIVE | Noted: 2018-10-16

## 2018-10-21 PROBLEM — F11.20 OPIOID DEPENDENCE, UNCOMPLICATED: Chronic | Status: ACTIVE | Noted: 2018-10-16

## 2018-10-21 PROBLEM — F17.200 NICOTINE DEPENDENCE, UNSPECIFIED, UNCOMPLICATED: Chronic | Status: ACTIVE | Noted: 2018-10-16

## 2018-10-21 PROBLEM — F41.8 OTHER SPECIFIED ANXIETY DISORDERS: Chronic | Status: ACTIVE | Noted: 2018-10-16

## 2018-10-21 RX ORDER — NICOTINE POLACRILEX 2 MG
1 GUM BUCCAL DAILY
Qty: 0 | Refills: 0 | Status: DISCONTINUED | OUTPATIENT
Start: 2018-10-21 | End: 2018-11-16

## 2018-10-21 RX ORDER — HYDROXYZINE HCL 10 MG
100 TABLET ORAL AT BEDTIME
Qty: 0 | Refills: 0 | Status: DISCONTINUED | OUTPATIENT
Start: 2018-10-21 | End: 2018-10-21

## 2018-10-21 RX ORDER — METHOCARBAMOL 500 MG/1
500 TABLET, FILM COATED ORAL EVERY 6 HOURS
Qty: 0 | Refills: 0 | Status: DISCONTINUED | OUTPATIENT
Start: 2018-10-21 | End: 2018-11-13

## 2018-10-21 RX ORDER — TRAZODONE HCL 50 MG
100 TABLET ORAL AT BEDTIME
Qty: 0 | Refills: 0 | Status: DISCONTINUED | OUTPATIENT
Start: 2018-10-21 | End: 2018-11-16

## 2018-10-21 RX ORDER — MAGNESIUM HYDROXIDE 400 MG/1
30 TABLET, CHEWABLE ORAL ONCE
Qty: 0 | Refills: 0 | Status: DISCONTINUED | OUTPATIENT
Start: 2018-10-21 | End: 2018-11-16

## 2018-10-21 RX ORDER — IBUPROFEN 200 MG
600 TABLET ORAL EVERY 6 HOURS
Qty: 0 | Refills: 0 | Status: DISCONTINUED | OUTPATIENT
Start: 2018-10-21 | End: 2018-10-24

## 2018-10-21 RX ORDER — THIAMINE MONONITRATE (VIT B1) 100 MG
100 TABLET ORAL DAILY
Qty: 0 | Refills: 0 | Status: COMPLETED | OUTPATIENT
Start: 2018-10-21 | End: 2018-10-24

## 2018-10-21 RX ORDER — SERTRALINE 25 MG/1
100 TABLET, FILM COATED ORAL DAILY
Qty: 0 | Refills: 0 | Status: DISCONTINUED | OUTPATIENT
Start: 2018-10-21 | End: 2018-11-08

## 2018-10-21 RX ORDER — MULTIVIT-MIN/FERROUS GLUCONATE 9 MG/15 ML
1 LIQUID (ML) ORAL DAILY
Qty: 0 | Refills: 0 | Status: DISCONTINUED | OUTPATIENT
Start: 2018-10-21 | End: 2018-11-16

## 2018-10-21 RX ORDER — FOLIC ACID 0.8 MG
1 TABLET ORAL DAILY
Qty: 0 | Refills: 0 | Status: DISCONTINUED | OUTPATIENT
Start: 2018-10-21 | End: 2018-11-16

## 2018-10-21 RX ORDER — HYDROXYZINE HCL 10 MG
50 TABLET ORAL EVERY 6 HOURS
Qty: 0 | Refills: 0 | Status: DISCONTINUED | OUTPATIENT
Start: 2018-10-21 | End: 2018-11-16

## 2018-10-21 RX ADMIN — SERTRALINE 100 MILLIGRAM(S): 25 TABLET, FILM COATED ORAL at 09:05

## 2018-10-21 RX ADMIN — Medication 650 MILLIGRAM(S): at 00:15

## 2018-10-21 RX ADMIN — METHOCARBAMOL 500 MILLIGRAM(S): 500 TABLET, FILM COATED ORAL at 20:57

## 2018-10-21 RX ADMIN — Medication 1 PATCH: at 09:05

## 2018-10-21 RX ADMIN — PANTOPRAZOLE SODIUM 40 MILLIGRAM(S): 20 TABLET, DELAYED RELEASE ORAL at 06:11

## 2018-10-21 RX ADMIN — Medication 50 MILLIGRAM(S): at 23:33

## 2018-10-21 RX ADMIN — Medication 600 MILLIGRAM(S): at 15:55

## 2018-10-21 RX ADMIN — Medication 650 MILLIGRAM(S): at 01:15

## 2018-10-21 RX ADMIN — Medication 100 MILLIGRAM(S): at 20:57

## 2018-10-21 RX ADMIN — Medication 600 MILLIGRAM(S): at 23:33

## 2018-10-21 RX ADMIN — Medication 32.4 MILLIGRAM(S): at 06:11

## 2018-10-21 RX ADMIN — METHOCARBAMOL 500 MILLIGRAM(S): 500 TABLET, FILM COATED ORAL at 12:37

## 2018-10-21 RX ADMIN — Medication 1 PATCH: at 06:34

## 2018-10-21 NOTE — CHART NOTE - NSCHARTNOTEFT_GEN_A_CORE
The patient was admitted to the inpt detox unit CDU, for   ETOH_x___ Opioid___  Benzo__x__Polysubstance _____ Dependency.    Pt was admitted from ED____, Intake_x___, Med/surg Floor_______.    Details are present in the preceding History & Physical section and follow up chart notes.  patient was evaluated on daily detox team  rounds.  Withdrawal symptoms and signs were reviewed on a daily basis, and the protocols were adjusted accordingly.    Labs and imaging results were reviewed and discussed with the patient.    All questions from the patient were addressed.  The patient was seen by the Chemical dependency counselors, and different options for after care were discussed.  The patient attended groups, meetings and 1:1 sessions with the counselors.  Narcane Kit was offered and instructions given prior to discharge.    Psychiatry consultation reviewed__x____, N/A______    Physical therapy evaluation reviewed_____, N/A_x___    Pt was given copies of labs and imaging reports, if applicable.    Prescriptions if needed, were sent through [employer] system to the pharmacy amnd are noted in the discharge instruction sheet.    After care was arranged by counselors and pt was discharged to:    Home___, Outpt. Program___, Rehab _x__, Long term____ [facility] ____ [facility]____ SNF____, AMA___, Admin Discharge____    Principal Diagnosis: Alcohol Dependency_x___ Opioid Dependency___ Benzo Dependency__x__ Polysubstance Dependency____

## 2018-10-22 RX ADMIN — METHOCARBAMOL 500 MILLIGRAM(S): 500 TABLET, FILM COATED ORAL at 23:36

## 2018-10-22 RX ADMIN — Medication 100 MILLIGRAM(S): at 20:54

## 2018-10-22 RX ADMIN — METHOCARBAMOL 500 MILLIGRAM(S): 500 TABLET, FILM COATED ORAL at 17:53

## 2018-10-22 RX ADMIN — Medication 600 MILLIGRAM(S): at 17:53

## 2018-10-22 RX ADMIN — SERTRALINE 100 MILLIGRAM(S): 25 TABLET, FILM COATED ORAL at 08:57

## 2018-10-22 RX ADMIN — METHOCARBAMOL 500 MILLIGRAM(S): 500 TABLET, FILM COATED ORAL at 08:57

## 2018-10-22 RX ADMIN — Medication 50 MILLIGRAM(S): at 20:57

## 2018-10-22 RX ADMIN — Medication 600 MILLIGRAM(S): at 23:35

## 2018-10-22 RX ADMIN — Medication 600 MILLIGRAM(S): at 20:55

## 2018-10-22 RX ADMIN — Medication 1 PATCH: at 08:57

## 2018-10-23 RX ORDER — GABAPENTIN 400 MG/1
100 CAPSULE ORAL EVERY 8 HOURS
Qty: 0 | Refills: 0 | Status: DISCONTINUED | OUTPATIENT
Start: 2018-10-23 | End: 2018-11-16

## 2018-10-23 RX ADMIN — GABAPENTIN 100 MILLIGRAM(S): 400 CAPSULE ORAL at 14:01

## 2018-10-23 RX ADMIN — METHOCARBAMOL 500 MILLIGRAM(S): 500 TABLET, FILM COATED ORAL at 08:29

## 2018-10-23 RX ADMIN — Medication 50 MILLIGRAM(S): at 09:58

## 2018-10-23 RX ADMIN — Medication 1 PATCH: at 13:49

## 2018-10-23 RX ADMIN — Medication 1 PATCH: at 08:27

## 2018-10-23 RX ADMIN — Medication 500 MILLIGRAM(S): at 20:50

## 2018-10-23 RX ADMIN — Medication 600 MILLIGRAM(S): at 09:59

## 2018-10-23 RX ADMIN — GABAPENTIN 100 MILLIGRAM(S): 400 CAPSULE ORAL at 20:50

## 2018-10-23 RX ADMIN — SERTRALINE 100 MILLIGRAM(S): 25 TABLET, FILM COATED ORAL at 08:27

## 2018-10-23 RX ADMIN — Medication 1 MILLIGRAM(S): at 08:29

## 2018-10-23 RX ADMIN — Medication 100 MILLIGRAM(S): at 20:49

## 2018-10-23 NOTE — CHART NOTE - NSCHARTNOTEFT_GEN_A_CORE
pt states on gabapentin, fioricet and mobic at home.    pt understood that we do not carry pheno, mobic    will start on gabapentin...will start on naprosyn

## 2018-10-24 RX ADMIN — Medication 50 MILLIGRAM(S): at 23:11

## 2018-10-24 RX ADMIN — Medication 1 PATCH: at 08:12

## 2018-10-24 RX ADMIN — Medication 100 MILLIGRAM(S): at 20:56

## 2018-10-24 RX ADMIN — Medication 1 TABLET(S): at 08:09

## 2018-10-24 RX ADMIN — GABAPENTIN 100 MILLIGRAM(S): 400 CAPSULE ORAL at 12:39

## 2018-10-24 RX ADMIN — Medication 500 MILLIGRAM(S): at 08:09

## 2018-10-24 RX ADMIN — GABAPENTIN 100 MILLIGRAM(S): 400 CAPSULE ORAL at 20:56

## 2018-10-24 RX ADMIN — Medication 100 MILLIGRAM(S): at 08:09

## 2018-10-24 RX ADMIN — METHOCARBAMOL 500 MILLIGRAM(S): 500 TABLET, FILM COATED ORAL at 08:10

## 2018-10-24 RX ADMIN — GABAPENTIN 100 MILLIGRAM(S): 400 CAPSULE ORAL at 07:13

## 2018-10-24 RX ADMIN — SERTRALINE 100 MILLIGRAM(S): 25 TABLET, FILM COATED ORAL at 08:09

## 2018-10-24 RX ADMIN — Medication 1 MILLIGRAM(S): at 08:09

## 2018-10-24 RX ADMIN — Medication 500 MILLIGRAM(S): at 20:55

## 2018-10-24 RX ADMIN — Medication 1 PATCH: at 08:09

## 2018-10-24 RX ADMIN — METHOCARBAMOL 500 MILLIGRAM(S): 500 TABLET, FILM COATED ORAL at 14:06

## 2018-10-25 RX ADMIN — METHOCARBAMOL 500 MILLIGRAM(S): 500 TABLET, FILM COATED ORAL at 08:36

## 2018-10-25 RX ADMIN — SERTRALINE 100 MILLIGRAM(S): 25 TABLET, FILM COATED ORAL at 08:33

## 2018-10-25 RX ADMIN — Medication 1 PATCH: at 08:33

## 2018-10-25 RX ADMIN — Medication 100 MILLIGRAM(S): at 20:51

## 2018-10-25 RX ADMIN — Medication 500 MILLIGRAM(S): at 20:51

## 2018-10-25 RX ADMIN — Medication 1 PATCH: at 06:26

## 2018-10-25 RX ADMIN — GABAPENTIN 100 MILLIGRAM(S): 400 CAPSULE ORAL at 12:47

## 2018-10-25 RX ADMIN — METHOCARBAMOL 500 MILLIGRAM(S): 500 TABLET, FILM COATED ORAL at 14:11

## 2018-10-25 RX ADMIN — GABAPENTIN 100 MILLIGRAM(S): 400 CAPSULE ORAL at 06:26

## 2018-10-25 RX ADMIN — Medication 1 PATCH: at 08:34

## 2018-10-25 RX ADMIN — Medication 1 TABLET(S): at 08:33

## 2018-10-25 RX ADMIN — GABAPENTIN 100 MILLIGRAM(S): 400 CAPSULE ORAL at 20:51

## 2018-10-25 RX ADMIN — Medication 1 MILLIGRAM(S): at 08:33

## 2018-10-25 RX ADMIN — Medication 500 MILLIGRAM(S): at 08:32

## 2018-10-26 DIAGNOSIS — F14.20 COCAINE DEPENDENCE, UNCOMPLICATED: ICD-10-CM

## 2018-10-26 DIAGNOSIS — F10.20 ALCOHOL DEPENDENCE, UNCOMPLICATED: ICD-10-CM

## 2018-10-26 DIAGNOSIS — F41.1 GENERALIZED ANXIETY DISORDER: ICD-10-CM

## 2018-10-26 DIAGNOSIS — F11.20 OPIOID DEPENDENCE, UNCOMPLICATED: ICD-10-CM

## 2018-10-26 DIAGNOSIS — M54.5 LOW BACK PAIN: ICD-10-CM

## 2018-10-26 DIAGNOSIS — F13.20 SEDATIVE, HYPNOTIC OR ANXIOLYTIC DEPENDENCE, UNCOMPLICATED: ICD-10-CM

## 2018-10-26 DIAGNOSIS — G89.29 OTHER CHRONIC PAIN: ICD-10-CM

## 2018-10-26 DIAGNOSIS — F17.210 NICOTINE DEPENDENCE, CIGARETTES, UNCOMPLICATED: ICD-10-CM

## 2018-10-26 DIAGNOSIS — Z51.89 ENCOUNTER FOR OTHER SPECIFIED AFTERCARE: ICD-10-CM

## 2018-10-26 RX ADMIN — Medication 100 MILLIGRAM(S): at 21:08

## 2018-10-26 RX ADMIN — Medication 500 MILLIGRAM(S): at 08:23

## 2018-10-26 RX ADMIN — Medication 50 MILLIGRAM(S): at 23:28

## 2018-10-26 RX ADMIN — METHOCARBAMOL 500 MILLIGRAM(S): 500 TABLET, FILM COATED ORAL at 12:15

## 2018-10-26 RX ADMIN — GABAPENTIN 100 MILLIGRAM(S): 400 CAPSULE ORAL at 06:13

## 2018-10-26 RX ADMIN — Medication 1 MILLIGRAM(S): at 08:23

## 2018-10-26 RX ADMIN — Medication 1 PATCH: at 09:15

## 2018-10-26 RX ADMIN — METHOCARBAMOL 500 MILLIGRAM(S): 500 TABLET, FILM COATED ORAL at 06:13

## 2018-10-26 RX ADMIN — Medication 1 PATCH: at 08:23

## 2018-10-26 RX ADMIN — METHOCARBAMOL 500 MILLIGRAM(S): 500 TABLET, FILM COATED ORAL at 23:27

## 2018-10-26 RX ADMIN — Medication 1 TABLET(S): at 08:23

## 2018-10-26 RX ADMIN — GABAPENTIN 100 MILLIGRAM(S): 400 CAPSULE ORAL at 12:14

## 2018-10-26 RX ADMIN — GABAPENTIN 100 MILLIGRAM(S): 400 CAPSULE ORAL at 21:08

## 2018-10-26 RX ADMIN — Medication 500 MILLIGRAM(S): at 21:08

## 2018-10-26 RX ADMIN — SERTRALINE 100 MILLIGRAM(S): 25 TABLET, FILM COATED ORAL at 08:23

## 2018-10-27 RX ADMIN — Medication 1 PATCH: at 18:26

## 2018-10-27 RX ADMIN — GABAPENTIN 100 MILLIGRAM(S): 400 CAPSULE ORAL at 05:41

## 2018-10-27 RX ADMIN — Medication 100 MILLIGRAM(S): at 20:50

## 2018-10-27 RX ADMIN — GABAPENTIN 100 MILLIGRAM(S): 400 CAPSULE ORAL at 20:50

## 2018-10-27 RX ADMIN — Medication 500 MILLIGRAM(S): at 09:49

## 2018-10-27 RX ADMIN — Medication 500 MILLIGRAM(S): at 22:45

## 2018-10-27 RX ADMIN — Medication 1 PATCH: at 09:48

## 2018-10-27 RX ADMIN — Medication 1 PATCH: at 09:49

## 2018-10-27 RX ADMIN — Medication 500 MILLIGRAM(S): at 20:50

## 2018-10-27 RX ADMIN — METHOCARBAMOL 500 MILLIGRAM(S): 500 TABLET, FILM COATED ORAL at 18:46

## 2018-10-27 RX ADMIN — Medication 1 TABLET(S): at 09:49

## 2018-10-27 RX ADMIN — Medication 1 MILLIGRAM(S): at 09:49

## 2018-10-27 RX ADMIN — GABAPENTIN 100 MILLIGRAM(S): 400 CAPSULE ORAL at 12:12

## 2018-10-27 RX ADMIN — Medication 500 MILLIGRAM(S): at 15:00

## 2018-10-27 RX ADMIN — SERTRALINE 100 MILLIGRAM(S): 25 TABLET, FILM COATED ORAL at 09:49

## 2018-10-28 RX ADMIN — SERTRALINE 100 MILLIGRAM(S): 25 TABLET, FILM COATED ORAL at 09:44

## 2018-10-28 RX ADMIN — Medication 1 PATCH: at 09:46

## 2018-10-28 RX ADMIN — Medication 100 MILLIGRAM(S): at 20:42

## 2018-10-28 RX ADMIN — Medication 1 TABLET(S): at 09:44

## 2018-10-28 RX ADMIN — GABAPENTIN 100 MILLIGRAM(S): 400 CAPSULE ORAL at 06:20

## 2018-10-28 RX ADMIN — GABAPENTIN 100 MILLIGRAM(S): 400 CAPSULE ORAL at 12:56

## 2018-10-28 RX ADMIN — Medication 500 MILLIGRAM(S): at 09:44

## 2018-10-28 RX ADMIN — METHOCARBAMOL 500 MILLIGRAM(S): 500 TABLET, FILM COATED ORAL at 09:45

## 2018-10-28 RX ADMIN — METHOCARBAMOL 500 MILLIGRAM(S): 500 TABLET, FILM COATED ORAL at 15:49

## 2018-10-28 RX ADMIN — GABAPENTIN 100 MILLIGRAM(S): 400 CAPSULE ORAL at 20:42

## 2018-10-28 RX ADMIN — Medication 50 MILLIGRAM(S): at 23:54

## 2018-10-28 RX ADMIN — Medication 1 MILLIGRAM(S): at 09:44

## 2018-10-28 RX ADMIN — Medication 50 MILLIGRAM(S): at 00:24

## 2018-10-28 RX ADMIN — Medication 500 MILLIGRAM(S): at 20:42

## 2018-10-28 RX ADMIN — Medication 1 PATCH: at 09:47

## 2018-10-29 RX ADMIN — GABAPENTIN 100 MILLIGRAM(S): 400 CAPSULE ORAL at 12:46

## 2018-10-29 RX ADMIN — Medication 1 PATCH: at 09:51

## 2018-10-29 RX ADMIN — METHOCARBAMOL 500 MILLIGRAM(S): 500 TABLET, FILM COATED ORAL at 09:52

## 2018-10-29 RX ADMIN — SERTRALINE 100 MILLIGRAM(S): 25 TABLET, FILM COATED ORAL at 09:51

## 2018-10-29 RX ADMIN — Medication 50 MILLIGRAM(S): at 23:16

## 2018-10-29 RX ADMIN — Medication 100 MILLIGRAM(S): at 20:56

## 2018-10-29 RX ADMIN — Medication 1 PATCH: at 09:54

## 2018-10-29 RX ADMIN — Medication 1 PATCH: at 21:48

## 2018-10-29 RX ADMIN — GABAPENTIN 100 MILLIGRAM(S): 400 CAPSULE ORAL at 06:02

## 2018-10-29 RX ADMIN — METHOCARBAMOL 500 MILLIGRAM(S): 500 TABLET, FILM COATED ORAL at 18:49

## 2018-10-29 RX ADMIN — Medication 500 MILLIGRAM(S): at 09:51

## 2018-10-29 RX ADMIN — Medication 1 TABLET(S): at 09:51

## 2018-10-29 RX ADMIN — Medication 1 MILLIGRAM(S): at 09:51

## 2018-10-29 RX ADMIN — Medication 500 MILLIGRAM(S): at 20:56

## 2018-10-29 RX ADMIN — Medication 500 MILLIGRAM(S): at 20:57

## 2018-10-29 RX ADMIN — GABAPENTIN 100 MILLIGRAM(S): 400 CAPSULE ORAL at 20:56

## 2018-10-30 RX ADMIN — GABAPENTIN 100 MILLIGRAM(S): 400 CAPSULE ORAL at 20:52

## 2018-10-30 RX ADMIN — Medication 1 MILLIGRAM(S): at 09:40

## 2018-10-30 RX ADMIN — Medication 50 MILLIGRAM(S): at 23:12

## 2018-10-30 RX ADMIN — METHOCARBAMOL 500 MILLIGRAM(S): 500 TABLET, FILM COATED ORAL at 09:40

## 2018-10-30 RX ADMIN — METHOCARBAMOL 500 MILLIGRAM(S): 500 TABLET, FILM COATED ORAL at 17:59

## 2018-10-30 RX ADMIN — Medication 1 PATCH: at 09:42

## 2018-10-30 RX ADMIN — Medication 1 TABLET(S): at 09:40

## 2018-10-30 RX ADMIN — GABAPENTIN 100 MILLIGRAM(S): 400 CAPSULE ORAL at 05:23

## 2018-10-30 RX ADMIN — Medication 500 MILLIGRAM(S): at 20:52

## 2018-10-30 RX ADMIN — SERTRALINE 100 MILLIGRAM(S): 25 TABLET, FILM COATED ORAL at 09:40

## 2018-10-30 RX ADMIN — Medication 500 MILLIGRAM(S): at 09:39

## 2018-10-30 RX ADMIN — GABAPENTIN 100 MILLIGRAM(S): 400 CAPSULE ORAL at 13:00

## 2018-10-30 RX ADMIN — Medication 100 MILLIGRAM(S): at 20:52

## 2018-10-30 RX ADMIN — Medication 1 PATCH: at 09:43

## 2018-10-31 RX ADMIN — Medication 1 TABLET(S): at 09:12

## 2018-10-31 RX ADMIN — Medication 1 MILLIGRAM(S): at 09:12

## 2018-10-31 RX ADMIN — Medication 1 PATCH: at 09:49

## 2018-10-31 RX ADMIN — GABAPENTIN 100 MILLIGRAM(S): 400 CAPSULE ORAL at 05:23

## 2018-10-31 RX ADMIN — GABAPENTIN 100 MILLIGRAM(S): 400 CAPSULE ORAL at 12:20

## 2018-10-31 RX ADMIN — SERTRALINE 100 MILLIGRAM(S): 25 TABLET, FILM COATED ORAL at 09:12

## 2018-10-31 RX ADMIN — METHOCARBAMOL 500 MILLIGRAM(S): 500 TABLET, FILM COATED ORAL at 20:53

## 2018-10-31 RX ADMIN — Medication 1 PATCH: at 09:12

## 2018-10-31 RX ADMIN — METHOCARBAMOL 500 MILLIGRAM(S): 500 TABLET, FILM COATED ORAL at 12:21

## 2018-10-31 RX ADMIN — Medication 500 MILLIGRAM(S): at 09:11

## 2018-10-31 RX ADMIN — GABAPENTIN 100 MILLIGRAM(S): 400 CAPSULE ORAL at 20:54

## 2018-10-31 RX ADMIN — Medication 100 MILLIGRAM(S): at 22:11

## 2018-10-31 RX ADMIN — Medication 500 MILLIGRAM(S): at 20:54

## 2018-11-01 RX ADMIN — Medication 100 MILLIGRAM(S): at 20:51

## 2018-11-01 RX ADMIN — Medication 500 MILLIGRAM(S): at 08:53

## 2018-11-01 RX ADMIN — Medication 1 TABLET(S): at 08:53

## 2018-11-01 RX ADMIN — Medication 50 MILLIGRAM(S): at 23:36

## 2018-11-01 RX ADMIN — GABAPENTIN 100 MILLIGRAM(S): 400 CAPSULE ORAL at 05:43

## 2018-11-01 RX ADMIN — GABAPENTIN 100 MILLIGRAM(S): 400 CAPSULE ORAL at 20:50

## 2018-11-01 RX ADMIN — METHOCARBAMOL 500 MILLIGRAM(S): 500 TABLET, FILM COATED ORAL at 12:16

## 2018-11-01 RX ADMIN — SERTRALINE 100 MILLIGRAM(S): 25 TABLET, FILM COATED ORAL at 08:53

## 2018-11-01 RX ADMIN — Medication 500 MILLIGRAM(S): at 20:50

## 2018-11-01 RX ADMIN — GABAPENTIN 100 MILLIGRAM(S): 400 CAPSULE ORAL at 12:15

## 2018-11-01 RX ADMIN — Medication 1 MILLIGRAM(S): at 08:53

## 2018-11-01 RX ADMIN — Medication 1 PATCH: at 08:53

## 2018-11-02 RX ADMIN — METHOCARBAMOL 500 MILLIGRAM(S): 500 TABLET, FILM COATED ORAL at 09:08

## 2018-11-02 RX ADMIN — GABAPENTIN 100 MILLIGRAM(S): 400 CAPSULE ORAL at 20:58

## 2018-11-02 RX ADMIN — SERTRALINE 100 MILLIGRAM(S): 25 TABLET, FILM COATED ORAL at 09:09

## 2018-11-02 RX ADMIN — GABAPENTIN 100 MILLIGRAM(S): 400 CAPSULE ORAL at 12:35

## 2018-11-02 RX ADMIN — Medication 1 PATCH: at 09:09

## 2018-11-02 RX ADMIN — Medication 1 PATCH: at 09:11

## 2018-11-02 RX ADMIN — Medication 500 MILLIGRAM(S): at 09:07

## 2018-11-02 RX ADMIN — Medication 50 MILLIGRAM(S): at 23:30

## 2018-11-02 RX ADMIN — Medication 500 MILLIGRAM(S): at 20:58

## 2018-11-02 RX ADMIN — Medication 100 MILLIGRAM(S): at 20:58

## 2018-11-02 RX ADMIN — Medication 500 MILLIGRAM(S): at 09:11

## 2018-11-02 RX ADMIN — GABAPENTIN 100 MILLIGRAM(S): 400 CAPSULE ORAL at 06:38

## 2018-11-02 RX ADMIN — METHOCARBAMOL 500 MILLIGRAM(S): 500 TABLET, FILM COATED ORAL at 17:21

## 2018-11-02 RX ADMIN — Medication 1 TABLET(S): at 09:09

## 2018-11-02 RX ADMIN — Medication 1 MILLIGRAM(S): at 09:09

## 2018-11-03 RX ADMIN — Medication 500 MILLIGRAM(S): at 20:49

## 2018-11-03 RX ADMIN — Medication 100 MILLIGRAM(S): at 20:49

## 2018-11-03 RX ADMIN — Medication 1 TABLET(S): at 10:19

## 2018-11-03 RX ADMIN — Medication 50 MILLIGRAM(S): at 23:32

## 2018-11-03 RX ADMIN — Medication 500 MILLIGRAM(S): at 18:54

## 2018-11-03 RX ADMIN — Medication 500 MILLIGRAM(S): at 22:57

## 2018-11-03 RX ADMIN — GABAPENTIN 100 MILLIGRAM(S): 400 CAPSULE ORAL at 06:27

## 2018-11-03 RX ADMIN — Medication 500 MILLIGRAM(S): at 10:19

## 2018-11-03 RX ADMIN — GABAPENTIN 100 MILLIGRAM(S): 400 CAPSULE ORAL at 20:48

## 2018-11-03 RX ADMIN — GABAPENTIN 100 MILLIGRAM(S): 400 CAPSULE ORAL at 12:18

## 2018-11-03 RX ADMIN — Medication 1 MILLIGRAM(S): at 10:19

## 2018-11-03 RX ADMIN — SERTRALINE 100 MILLIGRAM(S): 25 TABLET, FILM COATED ORAL at 10:22

## 2018-11-03 RX ADMIN — Medication 1 PATCH: at 10:21

## 2018-11-03 RX ADMIN — METHOCARBAMOL 500 MILLIGRAM(S): 500 TABLET, FILM COATED ORAL at 12:19

## 2018-11-04 RX ADMIN — GABAPENTIN 100 MILLIGRAM(S): 400 CAPSULE ORAL at 20:56

## 2018-11-04 RX ADMIN — METHOCARBAMOL 500 MILLIGRAM(S): 500 TABLET, FILM COATED ORAL at 20:57

## 2018-11-04 RX ADMIN — SERTRALINE 100 MILLIGRAM(S): 25 TABLET, FILM COATED ORAL at 09:04

## 2018-11-04 RX ADMIN — Medication 500 MILLIGRAM(S): at 20:56

## 2018-11-04 RX ADMIN — Medication 1 PATCH: at 09:20

## 2018-11-04 RX ADMIN — Medication 500 MILLIGRAM(S): at 09:04

## 2018-11-04 RX ADMIN — METHOCARBAMOL 500 MILLIGRAM(S): 500 TABLET, FILM COATED ORAL at 15:53

## 2018-11-04 RX ADMIN — GABAPENTIN 100 MILLIGRAM(S): 400 CAPSULE ORAL at 12:22

## 2018-11-04 RX ADMIN — Medication 500 MILLIGRAM(S): at 15:53

## 2018-11-04 RX ADMIN — Medication 1 MILLIGRAM(S): at 09:04

## 2018-11-04 RX ADMIN — Medication 1 TABLET(S): at 09:04

## 2018-11-04 RX ADMIN — Medication 1 PATCH: at 09:03

## 2018-11-04 RX ADMIN — Medication 50 MILLIGRAM(S): at 23:06

## 2018-11-04 RX ADMIN — Medication 100 MILLIGRAM(S): at 20:56

## 2018-11-04 RX ADMIN — GABAPENTIN 100 MILLIGRAM(S): 400 CAPSULE ORAL at 05:42

## 2018-11-05 RX ADMIN — Medication 500 MILLIGRAM(S): at 20:56

## 2018-11-05 RX ADMIN — GABAPENTIN 100 MILLIGRAM(S): 400 CAPSULE ORAL at 05:56

## 2018-11-05 RX ADMIN — Medication 50 MILLIGRAM(S): at 23:37

## 2018-11-05 RX ADMIN — Medication 1 PATCH: at 09:47

## 2018-11-05 RX ADMIN — Medication 1 TABLET(S): at 09:47

## 2018-11-05 RX ADMIN — SERTRALINE 100 MILLIGRAM(S): 25 TABLET, FILM COATED ORAL at 10:33

## 2018-11-05 RX ADMIN — METHOCARBAMOL 500 MILLIGRAM(S): 500 TABLET, FILM COATED ORAL at 17:22

## 2018-11-05 RX ADMIN — Medication 100 MILLIGRAM(S): at 20:56

## 2018-11-05 RX ADMIN — Medication 500 MILLIGRAM(S): at 09:47

## 2018-11-05 RX ADMIN — METHOCARBAMOL 500 MILLIGRAM(S): 500 TABLET, FILM COATED ORAL at 09:48

## 2018-11-05 RX ADMIN — Medication 1 MILLIGRAM(S): at 09:47

## 2018-11-05 RX ADMIN — GABAPENTIN 100 MILLIGRAM(S): 400 CAPSULE ORAL at 20:56

## 2018-11-05 RX ADMIN — GABAPENTIN 100 MILLIGRAM(S): 400 CAPSULE ORAL at 12:01

## 2018-11-06 RX ADMIN — Medication 50 MILLIGRAM(S): at 21:55

## 2018-11-06 RX ADMIN — GABAPENTIN 100 MILLIGRAM(S): 400 CAPSULE ORAL at 08:26

## 2018-11-06 RX ADMIN — GABAPENTIN 100 MILLIGRAM(S): 400 CAPSULE ORAL at 12:56

## 2018-11-06 RX ADMIN — Medication 1 PATCH: at 08:26

## 2018-11-06 RX ADMIN — Medication 500 MILLIGRAM(S): at 20:45

## 2018-11-06 RX ADMIN — Medication 100 MILLIGRAM(S): at 20:44

## 2018-11-06 RX ADMIN — GABAPENTIN 100 MILLIGRAM(S): 400 CAPSULE ORAL at 20:45

## 2018-11-06 RX ADMIN — Medication 500 MILLIGRAM(S): at 08:26

## 2018-11-06 RX ADMIN — Medication 1 TABLET(S): at 08:26

## 2018-11-06 RX ADMIN — Medication 1 MILLIGRAM(S): at 08:26

## 2018-11-06 RX ADMIN — METHOCARBAMOL 500 MILLIGRAM(S): 500 TABLET, FILM COATED ORAL at 15:14

## 2018-11-06 RX ADMIN — METHOCARBAMOL 500 MILLIGRAM(S): 500 TABLET, FILM COATED ORAL at 08:28

## 2018-11-06 RX ADMIN — SERTRALINE 100 MILLIGRAM(S): 25 TABLET, FILM COATED ORAL at 08:26

## 2018-11-07 RX ADMIN — Medication 1 PATCH: at 10:03

## 2018-11-07 RX ADMIN — Medication 1 PATCH: at 10:01

## 2018-11-07 RX ADMIN — SERTRALINE 100 MILLIGRAM(S): 25 TABLET, FILM COATED ORAL at 10:02

## 2018-11-07 RX ADMIN — Medication 50 MILLIGRAM(S): at 23:32

## 2018-11-07 RX ADMIN — GABAPENTIN 100 MILLIGRAM(S): 400 CAPSULE ORAL at 12:09

## 2018-11-07 RX ADMIN — Medication 1 TABLET(S): at 10:02

## 2018-11-07 RX ADMIN — GABAPENTIN 100 MILLIGRAM(S): 400 CAPSULE ORAL at 10:02

## 2018-11-07 RX ADMIN — METHOCARBAMOL 500 MILLIGRAM(S): 500 TABLET, FILM COATED ORAL at 18:42

## 2018-11-07 RX ADMIN — Medication 1 MILLIGRAM(S): at 10:02

## 2018-11-07 RX ADMIN — METHOCARBAMOL 500 MILLIGRAM(S): 500 TABLET, FILM COATED ORAL at 12:10

## 2018-11-07 RX ADMIN — Medication 500 MILLIGRAM(S): at 10:02

## 2018-11-07 RX ADMIN — Medication 100 MILLIGRAM(S): at 21:02

## 2018-11-07 RX ADMIN — Medication 500 MILLIGRAM(S): at 21:01

## 2018-11-08 RX ORDER — SERTRALINE 25 MG/1
150 TABLET, FILM COATED ORAL DAILY
Qty: 0 | Refills: 0 | Status: DISCONTINUED | OUTPATIENT
Start: 2018-11-08 | End: 2018-11-16

## 2018-11-08 RX ADMIN — Medication 1 PATCH: at 08:54

## 2018-11-08 RX ADMIN — GABAPENTIN 100 MILLIGRAM(S): 400 CAPSULE ORAL at 20:39

## 2018-11-08 RX ADMIN — METHOCARBAMOL 500 MILLIGRAM(S): 500 TABLET, FILM COATED ORAL at 20:39

## 2018-11-08 RX ADMIN — SERTRALINE 100 MILLIGRAM(S): 25 TABLET, FILM COATED ORAL at 08:52

## 2018-11-08 RX ADMIN — Medication 50 MILLIGRAM(S): at 22:57

## 2018-11-08 RX ADMIN — GABAPENTIN 100 MILLIGRAM(S): 400 CAPSULE ORAL at 12:06

## 2018-11-08 RX ADMIN — GABAPENTIN 100 MILLIGRAM(S): 400 CAPSULE ORAL at 05:24

## 2018-11-08 RX ADMIN — Medication 500 MILLIGRAM(S): at 08:52

## 2018-11-08 RX ADMIN — Medication 1 PATCH: at 09:50

## 2018-11-08 RX ADMIN — Medication 500 MILLIGRAM(S): at 20:39

## 2018-11-08 RX ADMIN — Medication 100 MILLIGRAM(S): at 20:39

## 2018-11-08 RX ADMIN — Medication 1 TABLET(S): at 08:52

## 2018-11-08 RX ADMIN — Medication 500 MILLIGRAM(S): at 20:38

## 2018-11-08 RX ADMIN — Medication 1 MILLIGRAM(S): at 08:52

## 2018-11-08 RX ADMIN — METHOCARBAMOL 500 MILLIGRAM(S): 500 TABLET, FILM COATED ORAL at 08:53

## 2018-11-09 RX ADMIN — Medication 1 PATCH: at 09:53

## 2018-11-09 RX ADMIN — GABAPENTIN 100 MILLIGRAM(S): 400 CAPSULE ORAL at 05:25

## 2018-11-09 RX ADMIN — Medication 1 TABLET(S): at 09:52

## 2018-11-09 RX ADMIN — Medication 100 MILLIGRAM(S): at 20:49

## 2018-11-09 RX ADMIN — GABAPENTIN 100 MILLIGRAM(S): 400 CAPSULE ORAL at 20:49

## 2018-11-09 RX ADMIN — SERTRALINE 150 MILLIGRAM(S): 25 TABLET, FILM COATED ORAL at 09:51

## 2018-11-09 RX ADMIN — Medication 500 MILLIGRAM(S): at 20:48

## 2018-11-09 RX ADMIN — GABAPENTIN 100 MILLIGRAM(S): 400 CAPSULE ORAL at 12:47

## 2018-11-09 RX ADMIN — GABAPENTIN 100 MILLIGRAM(S): 400 CAPSULE ORAL at 12:49

## 2018-11-09 RX ADMIN — Medication 1 MILLIGRAM(S): at 09:52

## 2018-11-09 RX ADMIN — Medication 500 MILLIGRAM(S): at 09:51

## 2018-11-09 RX ADMIN — Medication 50 MILLIGRAM(S): at 21:47

## 2018-11-09 RX ADMIN — METHOCARBAMOL 500 MILLIGRAM(S): 500 TABLET, FILM COATED ORAL at 12:48

## 2018-11-09 RX ADMIN — Medication 500 MILLIGRAM(S): at 20:49

## 2018-11-10 RX ADMIN — Medication 1 TABLET(S): at 09:14

## 2018-11-10 RX ADMIN — Medication 1 MILLIGRAM(S): at 09:14

## 2018-11-10 RX ADMIN — Medication 500 MILLIGRAM(S): at 09:15

## 2018-11-10 RX ADMIN — METHOCARBAMOL 500 MILLIGRAM(S): 500 TABLET, FILM COATED ORAL at 23:26

## 2018-11-10 RX ADMIN — GABAPENTIN 100 MILLIGRAM(S): 400 CAPSULE ORAL at 12:26

## 2018-11-10 RX ADMIN — Medication 500 MILLIGRAM(S): at 20:36

## 2018-11-10 RX ADMIN — GABAPENTIN 100 MILLIGRAM(S): 400 CAPSULE ORAL at 09:14

## 2018-11-10 RX ADMIN — METHOCARBAMOL 500 MILLIGRAM(S): 500 TABLET, FILM COATED ORAL at 12:27

## 2018-11-10 RX ADMIN — Medication 1 PATCH: at 09:16

## 2018-11-10 RX ADMIN — SERTRALINE 150 MILLIGRAM(S): 25 TABLET, FILM COATED ORAL at 09:14

## 2018-11-10 RX ADMIN — GABAPENTIN 100 MILLIGRAM(S): 400 CAPSULE ORAL at 20:36

## 2018-11-10 RX ADMIN — Medication 50 MILLIGRAM(S): at 23:25

## 2018-11-10 RX ADMIN — Medication 1 PATCH: at 09:15

## 2018-11-10 RX ADMIN — Medication 100 MILLIGRAM(S): at 20:36

## 2018-11-11 RX ADMIN — GABAPENTIN 100 MILLIGRAM(S): 400 CAPSULE ORAL at 21:02

## 2018-11-11 RX ADMIN — SERTRALINE 150 MILLIGRAM(S): 25 TABLET, FILM COATED ORAL at 08:44

## 2018-11-11 RX ADMIN — Medication 1 PATCH: at 08:43

## 2018-11-11 RX ADMIN — GABAPENTIN 100 MILLIGRAM(S): 400 CAPSULE ORAL at 08:45

## 2018-11-11 RX ADMIN — Medication 500 MILLIGRAM(S): at 08:44

## 2018-11-11 RX ADMIN — Medication 1 PATCH: at 09:33

## 2018-11-11 RX ADMIN — Medication 100 MILLIGRAM(S): at 21:01

## 2018-11-11 RX ADMIN — Medication 50 MILLIGRAM(S): at 23:44

## 2018-11-11 RX ADMIN — GABAPENTIN 100 MILLIGRAM(S): 400 CAPSULE ORAL at 12:06

## 2018-11-11 RX ADMIN — METHOCARBAMOL 500 MILLIGRAM(S): 500 TABLET, FILM COATED ORAL at 23:44

## 2018-11-11 RX ADMIN — METHOCARBAMOL 500 MILLIGRAM(S): 500 TABLET, FILM COATED ORAL at 14:43

## 2018-11-11 RX ADMIN — Medication 1 MILLIGRAM(S): at 08:44

## 2018-11-11 RX ADMIN — Medication 1 TABLET(S): at 08:44

## 2018-11-11 RX ADMIN — Medication 500 MILLIGRAM(S): at 21:01

## 2018-11-12 RX ADMIN — GABAPENTIN 100 MILLIGRAM(S): 400 CAPSULE ORAL at 17:04

## 2018-11-12 RX ADMIN — GABAPENTIN 100 MILLIGRAM(S): 400 CAPSULE ORAL at 11:50

## 2018-11-12 RX ADMIN — Medication 1 PATCH: at 11:51

## 2018-11-12 RX ADMIN — Medication 500 MILLIGRAM(S): at 11:50

## 2018-11-12 RX ADMIN — Medication 500 MILLIGRAM(S): at 20:52

## 2018-11-12 RX ADMIN — Medication 1 MILLIGRAM(S): at 11:50

## 2018-11-12 RX ADMIN — GABAPENTIN 100 MILLIGRAM(S): 400 CAPSULE ORAL at 22:35

## 2018-11-12 RX ADMIN — Medication 1 TABLET(S): at 11:50

## 2018-11-12 RX ADMIN — METHOCARBAMOL 500 MILLIGRAM(S): 500 TABLET, FILM COATED ORAL at 17:04

## 2018-11-12 RX ADMIN — SERTRALINE 150 MILLIGRAM(S): 25 TABLET, FILM COATED ORAL at 11:50

## 2018-11-12 RX ADMIN — Medication 500 MILLIGRAM(S): at 22:36

## 2018-11-12 RX ADMIN — Medication 100 MILLIGRAM(S): at 20:52

## 2018-11-12 RX ADMIN — Medication 50 MILLIGRAM(S): at 22:36

## 2018-11-12 RX ADMIN — Medication 500 MILLIGRAM(S): at 17:23

## 2018-11-13 RX ADMIN — METHOCARBAMOL 500 MILLIGRAM(S): 500 TABLET, FILM COATED ORAL at 08:17

## 2018-11-13 RX ADMIN — Medication 1 PATCH: at 08:17

## 2018-11-13 RX ADMIN — Medication 500 MILLIGRAM(S): at 08:17

## 2018-11-13 RX ADMIN — GABAPENTIN 100 MILLIGRAM(S): 400 CAPSULE ORAL at 12:45

## 2018-11-13 RX ADMIN — Medication 500 MILLIGRAM(S): at 21:01

## 2018-11-13 RX ADMIN — Medication 1 TABLET(S): at 08:17

## 2018-11-13 RX ADMIN — Medication 1 MILLIGRAM(S): at 08:17

## 2018-11-13 RX ADMIN — Medication 1 PATCH: at 08:19

## 2018-11-13 RX ADMIN — GABAPENTIN 100 MILLIGRAM(S): 400 CAPSULE ORAL at 05:29

## 2018-11-13 RX ADMIN — Medication 1 PATCH: at 08:20

## 2018-11-13 RX ADMIN — SERTRALINE 150 MILLIGRAM(S): 25 TABLET, FILM COATED ORAL at 08:17

## 2018-11-13 RX ADMIN — GABAPENTIN 100 MILLIGRAM(S): 400 CAPSULE ORAL at 21:01

## 2018-11-13 RX ADMIN — Medication 100 MILLIGRAM(S): at 21:00

## 2018-11-13 RX ADMIN — Medication 50 MILLIGRAM(S): at 23:13

## 2018-11-14 RX ADMIN — GABAPENTIN 100 MILLIGRAM(S): 400 CAPSULE ORAL at 12:23

## 2018-11-14 RX ADMIN — Medication 1 PATCH: at 09:07

## 2018-11-14 RX ADMIN — Medication 1 PATCH: at 09:05

## 2018-11-14 RX ADMIN — GABAPENTIN 100 MILLIGRAM(S): 400 CAPSULE ORAL at 05:51

## 2018-11-14 RX ADMIN — Medication 1 MILLIGRAM(S): at 09:00

## 2018-11-14 RX ADMIN — Medication 100 MILLIGRAM(S): at 20:39

## 2018-11-14 RX ADMIN — GABAPENTIN 100 MILLIGRAM(S): 400 CAPSULE ORAL at 20:40

## 2018-11-14 RX ADMIN — Medication 500 MILLIGRAM(S): at 09:06

## 2018-11-14 RX ADMIN — Medication 500 MILLIGRAM(S): at 20:39

## 2018-11-14 RX ADMIN — SERTRALINE 150 MILLIGRAM(S): 25 TABLET, FILM COATED ORAL at 09:00

## 2018-11-14 RX ADMIN — Medication 1 TABLET(S): at 09:00

## 2018-11-15 RX ADMIN — GABAPENTIN 100 MILLIGRAM(S): 400 CAPSULE ORAL at 12:49

## 2018-11-15 RX ADMIN — Medication 500 MILLIGRAM(S): at 08:39

## 2018-11-15 RX ADMIN — Medication 1 PATCH: at 08:41

## 2018-11-15 RX ADMIN — GABAPENTIN 100 MILLIGRAM(S): 400 CAPSULE ORAL at 05:26

## 2018-11-15 RX ADMIN — Medication 500 MILLIGRAM(S): at 20:40

## 2018-11-15 RX ADMIN — Medication 50 MILLIGRAM(S): at 22:16

## 2018-11-15 RX ADMIN — GABAPENTIN 100 MILLIGRAM(S): 400 CAPSULE ORAL at 20:40

## 2018-11-15 RX ADMIN — Medication 1 PATCH: at 08:40

## 2018-11-15 RX ADMIN — Medication 100 MILLIGRAM(S): at 20:39

## 2018-11-15 RX ADMIN — Medication 1 TABLET(S): at 08:39

## 2018-11-15 RX ADMIN — Medication 1 MILLIGRAM(S): at 08:39

## 2018-11-15 RX ADMIN — SERTRALINE 150 MILLIGRAM(S): 25 TABLET, FILM COATED ORAL at 08:39

## 2018-11-16 VITALS — TEMPERATURE: 98 F | HEART RATE: 69 BPM

## 2018-11-16 RX ORDER — SERTRALINE 25 MG/1
3 TABLET, FILM COATED ORAL
Qty: 0 | Refills: 0 | COMMUNITY
Start: 2018-11-16

## 2018-11-16 RX ORDER — SERTRALINE 25 MG/1
1 TABLET, FILM COATED ORAL
Qty: 14 | Refills: 0 | OUTPATIENT
Start: 2018-11-16 | End: 2018-11-29

## 2018-11-16 RX ORDER — GABAPENTIN 400 MG/1
1 CAPSULE ORAL
Qty: 42 | Refills: 0 | OUTPATIENT
Start: 2018-11-16 | End: 2018-11-29

## 2018-11-16 RX ORDER — GABAPENTIN 400 MG/1
1 CAPSULE ORAL
Qty: 0 | Refills: 0 | COMMUNITY
Start: 2018-11-16

## 2018-11-16 RX ADMIN — Medication 1 MILLIGRAM(S): at 09:06

## 2018-11-16 RX ADMIN — Medication 1 PATCH: at 09:08

## 2018-11-16 RX ADMIN — Medication 500 MILLIGRAM(S): at 09:05

## 2018-11-16 RX ADMIN — SERTRALINE 150 MILLIGRAM(S): 25 TABLET, FILM COATED ORAL at 09:05

## 2018-11-16 RX ADMIN — Medication 1 PATCH: at 09:07

## 2018-11-16 RX ADMIN — GABAPENTIN 100 MILLIGRAM(S): 400 CAPSULE ORAL at 09:06

## 2018-11-16 RX ADMIN — Medication 1 TABLET(S): at 09:05

## 2018-11-19 DIAGNOSIS — F10.20 ALCOHOL DEPENDENCE, UNCOMPLICATED: ICD-10-CM

## 2018-11-19 DIAGNOSIS — G89.29 OTHER CHRONIC PAIN: ICD-10-CM

## 2018-11-19 DIAGNOSIS — F32.9 MAJOR DEPRESSIVE DISORDER, SINGLE EPISODE, UNSPECIFIED: ICD-10-CM

## 2018-11-19 DIAGNOSIS — F13.20 SEDATIVE, HYPNOTIC OR ANXIOLYTIC DEPENDENCE, UNCOMPLICATED: ICD-10-CM

## 2018-11-19 DIAGNOSIS — F41.9 ANXIETY DISORDER, UNSPECIFIED: ICD-10-CM

## 2018-11-19 DIAGNOSIS — Z51.89 ENCOUNTER FOR OTHER SPECIFIED AFTERCARE: ICD-10-CM

## 2018-11-19 DIAGNOSIS — M54.9 DORSALGIA, UNSPECIFIED: ICD-10-CM

## 2018-11-19 DIAGNOSIS — Z28.21 IMMUNIZATION NOT CARRIED OUT BECAUSE OF PATIENT REFUSAL: ICD-10-CM

## 2018-11-19 DIAGNOSIS — F17.200 NICOTINE DEPENDENCE, UNSPECIFIED, UNCOMPLICATED: ICD-10-CM

## 2018-11-20 ENCOUNTER — OUTPATIENT (OUTPATIENT)
Dept: OUTPATIENT SERVICES | Facility: HOSPITAL | Age: 48
LOS: 1 days | Discharge: HOME | End: 2018-11-20

## 2018-11-20 DIAGNOSIS — F10.20 ALCOHOL DEPENDENCE, UNCOMPLICATED: ICD-10-CM

## 2018-11-20 DIAGNOSIS — Z98.1 ARTHRODESIS STATUS: Chronic | ICD-10-CM

## 2018-11-20 DIAGNOSIS — Z98.890 OTHER SPECIFIED POSTPROCEDURAL STATES: Chronic | ICD-10-CM

## 2018-12-23 ENCOUNTER — EMERGENCY (EMERGENCY)
Facility: HOSPITAL | Age: 48
LOS: 1 days | Discharge: HOME | End: 2018-12-23
Attending: EMERGENCY MEDICINE | Admitting: EMERGENCY MEDICINE
Payer: MEDICAID

## 2018-12-23 VITALS
RESPIRATION RATE: 17 BRPM | OXYGEN SATURATION: 98 % | SYSTOLIC BLOOD PRESSURE: 119 MMHG | WEIGHT: 130.07 LBS | DIASTOLIC BLOOD PRESSURE: 79 MMHG | HEART RATE: 89 BPM | TEMPERATURE: 98 F

## 2018-12-23 DIAGNOSIS — M54.2 CERVICALGIA: ICD-10-CM

## 2018-12-23 DIAGNOSIS — Z79.899 OTHER LONG TERM (CURRENT) DRUG THERAPY: ICD-10-CM

## 2018-12-23 DIAGNOSIS — Z76.0 ENCOUNTER FOR ISSUE OF REPEAT PRESCRIPTION: ICD-10-CM

## 2018-12-23 DIAGNOSIS — Z98.1 ARTHRODESIS STATUS: Chronic | ICD-10-CM

## 2018-12-23 DIAGNOSIS — Z98.890 OTHER SPECIFIED POSTPROCEDURAL STATES: Chronic | ICD-10-CM

## 2018-12-23 PROCEDURE — 99283 EMERGENCY DEPT VISIT LOW MDM: CPT

## 2018-12-23 RX ORDER — MELOXICAM 15 MG/1
1 TABLET ORAL
Qty: 30 | Refills: 0 | OUTPATIENT
Start: 2018-12-23 | End: 2019-01-21

## 2018-12-23 RX ORDER — CYCLOBENZAPRINE HYDROCHLORIDE 10 MG/1
10 TABLET, FILM COATED ORAL ONCE
Qty: 0 | Refills: 0 | Status: COMPLETED | OUTPATIENT
Start: 2018-12-23 | End: 2018-12-23

## 2018-12-23 RX ORDER — CYCLOBENZAPRINE HYDROCHLORIDE 10 MG/1
1 TABLET, FILM COATED ORAL
Qty: 30 | Refills: 0 | OUTPATIENT
Start: 2018-12-23 | End: 2019-01-21

## 2018-12-23 RX ORDER — IBUPROFEN 200 MG
600 TABLET ORAL ONCE
Qty: 0 | Refills: 0 | Status: COMPLETED | OUTPATIENT
Start: 2018-12-23 | End: 2018-12-23

## 2018-12-23 RX ADMIN — Medication 600 MILLIGRAM(S): at 13:02

## 2018-12-23 RX ADMIN — CYCLOBENZAPRINE HYDROCHLORIDE 10 MILLIGRAM(S): 10 TABLET, FILM COATED ORAL at 13:02

## 2018-12-23 RX ADMIN — Medication 1 TABLET(S): at 13:02

## 2018-12-23 NOTE — ED PROVIDER NOTE - NSFOLLOWUPINSTRUCTIONS_ED_ALL_ED_FT
Please call the office of Dr. De La Paz for a follow up or call [facility].  If they do not accept your insurance please call our referral line to get help finding a new MD.  Return to the ER for medical emergencies.

## 2018-12-23 NOTE — ED PROVIDER NOTE - MEDICAL DECISION MAKING DETAILS
Patient presents for med refill for meloxicam, cyclobenzaprine and Fioricet. Will refill meds and give recommendation.

## 2018-12-23 NOTE — ED ADULT TRIAGE NOTE - CHIEF COMPLAINT QUOTE
requesting medication refill for Meloxicam, cyclobenzaprine, Fioricet and a referral for a neck surgeon to continue neck injections. pt has medical records of cervical spine pain.

## 2018-12-23 NOTE — ED PROVIDER NOTE - CARE PROVIDER_API CALL
Loida De La Paz), Neurology  [address]  [address], [place] [address]  Phone: [phone]  Fax: [phone]

## 2018-12-23 NOTE — ED PROVIDER NOTE - OBJECTIVE STATEMENT
48 F with chronic neck pain- has "narrowing in the neck" that triggers migraines. She just moved her from [place] and needs med refills and a recommendation for neuro for injections. No new sx today but needs a dose. No red flag sx.

## 2018-12-23 NOTE — ED ADULT NURSE NOTE - NSIMPLEMENTINTERV_GEN_ALL_ED
Implemented All Universal Safety Interventions:  [place] to call system. Call bell, personal items and telephone within reach. Instruct patient to call for assistance. Room bathroom lighting operational. Non-slip footwear when patient is off stretcher. Physically safe environment: no spills, clutter or unnecessary equipment. Stretcher in lowest position, wheels locked, appropriate side rails in place.

## 2019-04-25 ENCOUNTER — TELEPHONE (OUTPATIENT)
Dept: NEUROLOGY | Facility: CLINIC | Age: 49
End: 2019-04-25

## 2021-06-03 ENCOUNTER — TELEPHONE (OUTPATIENT)
Dept: NEUROLOGY | Facility: CLINIC | Age: 51
End: 2021-06-03

## 2021-06-03 NOTE — TELEPHONE ENCOUNTER
Received medical records request from [facility] [employer]   Request faxed to [phone] and scanned into patients chart

## 2024-10-21 NOTE — ED ADULT NURSE NOTE - OBJECTIVE STATEMENT
PATIENT SCHEDULED FOR RIGHT TKA  WITH  AND SON PRESENT  CONSENT SIGNED WITH SON, WHO IS POA  MRSA CULTURE OBTAINED  SURGERY SCHEDULED FOR 11/19/24     requesting med refill due to recently moving to [place] from [place].

## 2025-07-24 ENCOUNTER — INPATIENT (INPATIENT)
Facility: HOSPITAL | Age: 55
LOS: 0 days | Discharge: HOME | DRG: 605 | End: 2025-07-25
Attending: STUDENT IN AN ORGANIZED HEALTH CARE EDUCATION/TRAINING PROGRAM | Admitting: STUDENT IN AN ORGANIZED HEALTH CARE EDUCATION/TRAINING PROGRAM
Payer: COMMERCIAL

## 2025-07-24 VITALS
TEMPERATURE: 98 F | RESPIRATION RATE: 16 BRPM | HEIGHT: 62 IN | HEART RATE: 96 BPM | DIASTOLIC BLOOD PRESSURE: 75 MMHG | SYSTOLIC BLOOD PRESSURE: 106 MMHG | OXYGEN SATURATION: 100 % | WEIGHT: 104.94 LBS

## 2025-07-24 DIAGNOSIS — L03.119 CELLULITIS OF UNSPECIFIED PART OF LIMB: ICD-10-CM

## 2025-07-24 DIAGNOSIS — Z98.890 OTHER SPECIFIED POSTPROCEDURAL STATES: Chronic | ICD-10-CM

## 2025-07-24 DIAGNOSIS — Z98.1 ARTHRODESIS STATUS: Chronic | ICD-10-CM

## 2025-07-24 LAB
ALBUMIN SERPL ELPH-MCNC: 3.3 G/DL — LOW (ref 3.4–5)
ALP SERPL-CCNC: 108 U/L — SIGNIFICANT CHANGE UP (ref 40–120)
ALT FLD-CCNC: 18 U/L — SIGNIFICANT CHANGE UP (ref 12–42)
ANION GAP SERPL CALC-SCNC: 7 MMOL/L — LOW (ref 9–16)
APTT BLD: 32.3 SEC — SIGNIFICANT CHANGE UP (ref 26.1–36.8)
AST SERPL-CCNC: 10 U/L — LOW (ref 15–37)
BASOPHILS # BLD AUTO: 0.07 K/UL — SIGNIFICANT CHANGE UP (ref 0–0.2)
BASOPHILS NFR BLD AUTO: 0.6 % — SIGNIFICANT CHANGE UP (ref 0–2)
BILIRUB SERPL-MCNC: 0.3 MG/DL — SIGNIFICANT CHANGE UP (ref 0.2–1.2)
BUN SERPL-MCNC: 11 MG/DL — SIGNIFICANT CHANGE UP (ref 7–23)
CALCIUM SERPL-MCNC: 8.7 MG/DL — SIGNIFICANT CHANGE UP (ref 8.5–10.5)
CHLORIDE SERPL-SCNC: 106 MMOL/L — SIGNIFICANT CHANGE UP (ref 96–108)
CO2 SERPL-SCNC: 30 MMOL/L — SIGNIFICANT CHANGE UP (ref 22–31)
CREAT SERPL-MCNC: 0.81 MG/DL — SIGNIFICANT CHANGE UP (ref 0.5–1.3)
EGFR: 86 ML/MIN/1.73M2 — SIGNIFICANT CHANGE UP
EGFR: 86 ML/MIN/1.73M2 — SIGNIFICANT CHANGE UP
EOSINOPHIL # BLD AUTO: 0.09 K/UL — SIGNIFICANT CHANGE UP (ref 0–0.5)
EOSINOPHIL NFR BLD AUTO: 0.8 % — SIGNIFICANT CHANGE UP (ref 0–6)
GLUCOSE SERPL-MCNC: 168 MG/DL — HIGH (ref 70–99)
HCT VFR BLD CALC: 42.6 % — SIGNIFICANT CHANGE UP (ref 34.5–45)
HGB BLD-MCNC: 14.5 G/DL — SIGNIFICANT CHANGE UP (ref 11.5–15.5)
IMM GRANULOCYTES # BLD AUTO: 0.05 K/UL — SIGNIFICANT CHANGE UP (ref 0–0.07)
IMM GRANULOCYTES NFR BLD AUTO: 0.4 % — SIGNIFICANT CHANGE UP (ref 0–0.9)
INR BLD: 1.01 — SIGNIFICANT CHANGE UP (ref 0.85–1.16)
LACTATE BLDV-MCNC: 2.3 MMOL/L — HIGH (ref 0.5–2)
LACTATE BLDV-MCNC: 3 MMOL/L — HIGH (ref 0.5–2)
LYMPHOCYTES # BLD AUTO: 1.94 K/UL — SIGNIFICANT CHANGE UP (ref 1–3.3)
LYMPHOCYTES NFR BLD AUTO: 17 % — SIGNIFICANT CHANGE UP (ref 13–44)
MCHC RBC-ENTMCNC: 30.8 PG — SIGNIFICANT CHANGE UP (ref 27–34)
MCHC RBC-ENTMCNC: 34 G/DL — SIGNIFICANT CHANGE UP (ref 32–36)
MCV RBC AUTO: 90.4 FL — SIGNIFICANT CHANGE UP (ref 80–100)
MONOCYTES # BLD AUTO: 0.66 K/UL — SIGNIFICANT CHANGE UP (ref 0–0.9)
MONOCYTES NFR BLD AUTO: 5.8 % — SIGNIFICANT CHANGE UP (ref 2–14)
NEUTROPHILS # BLD AUTO: 8.57 K/UL — HIGH (ref 1.8–7.4)
NEUTROPHILS NFR BLD AUTO: 75.4 % — SIGNIFICANT CHANGE UP (ref 43–77)
NRBC # BLD AUTO: 0 K/UL — SIGNIFICANT CHANGE UP (ref 0–0)
NRBC # FLD: 0 K/UL — SIGNIFICANT CHANGE UP (ref 0–0)
NRBC BLD AUTO-RTO: 0 /100 WBCS — SIGNIFICANT CHANGE UP (ref 0–0)
PLATELET # BLD AUTO: 322 K/UL — SIGNIFICANT CHANGE UP (ref 150–400)
PMV BLD: 10 FL — SIGNIFICANT CHANGE UP (ref 7–13)
POTASSIUM SERPL-MCNC: 4 MMOL/L — SIGNIFICANT CHANGE UP (ref 3.5–5.3)
POTASSIUM SERPL-SCNC: 4 MMOL/L — SIGNIFICANT CHANGE UP (ref 3.5–5.3)
PROT SERPL-MCNC: 7.2 G/DL — SIGNIFICANT CHANGE UP (ref 6.4–8.2)
PROTHROM AB SERPL-ACNC: 11.8 SEC — SIGNIFICANT CHANGE UP (ref 9.9–13.4)
RBC # BLD: 4.71 M/UL — SIGNIFICANT CHANGE UP (ref 3.8–5.2)
RBC # FLD: 12.6 % — SIGNIFICANT CHANGE UP (ref 10.3–14.5)
SODIUM SERPL-SCNC: 143 MMOL/L — SIGNIFICANT CHANGE UP (ref 132–145)
WBC # BLD: 11.38 K/UL — HIGH (ref 3.8–10.5)
WBC # FLD AUTO: 11.38 K/UL — HIGH (ref 3.8–10.5)

## 2025-07-24 PROCEDURE — 99285 EMERGENCY DEPT VISIT HI MDM: CPT

## 2025-07-24 PROCEDURE — 73110 X-RAY EXAM OF WRIST: CPT | Mod: 26,LT

## 2025-07-24 PROCEDURE — 73090 X-RAY EXAM OF FOREARM: CPT | Mod: 26,LT

## 2025-07-24 RX ORDER — VANCOMYCIN HCL IN 5 % DEXTROSE 1.5G/250ML
750 PLASTIC BAG, INJECTION (ML) INTRAVENOUS ONCE
Refills: 0 | Status: COMPLETED | OUTPATIENT
Start: 2025-07-24 | End: 2025-07-24

## 2025-07-24 RX ORDER — AMPICILLIN SODIUM AND SULBACTAM SODIUM 1; .5 G/1; G/1
3 INJECTION, POWDER, FOR SOLUTION INTRAMUSCULAR; INTRAVENOUS ONCE
Refills: 0 | Status: COMPLETED | OUTPATIENT
Start: 2025-07-24 | End: 2025-07-24

## 2025-07-24 RX ORDER — VANCOMYCIN HCL IN 5 % DEXTROSE 1.5G/250ML
1000 PLASTIC BAG, INJECTION (ML) INTRAVENOUS ONCE
Refills: 0 | Status: DISCONTINUED | OUTPATIENT
Start: 2025-07-24 | End: 2025-07-24

## 2025-07-24 RX ADMIN — Medication 250 MILLIGRAM(S): at 18:24

## 2025-07-24 RX ADMIN — AMPICILLIN SODIUM AND SULBACTAM SODIUM 200 GRAM(S): 1; .5 INJECTION, POWDER, FOR SOLUTION INTRAMUSCULAR; INTRAVENOUS at 15:12

## 2025-07-24 RX ADMIN — AMPICILLIN SODIUM AND SULBACTAM SODIUM 200 GRAM(S): 1; .5 INJECTION, POWDER, FOR SOLUTION INTRAMUSCULAR; INTRAVENOUS at 23:00

## 2025-07-24 RX ADMIN — Medication 1000 MILLILITER(S): at 18:25

## 2025-07-24 RX ADMIN — Medication 1000 MILLILITER(S): at 20:27

## 2025-07-25 VITALS
TEMPERATURE: 99 F | DIASTOLIC BLOOD PRESSURE: 77 MMHG | SYSTOLIC BLOOD PRESSURE: 115 MMHG | OXYGEN SATURATION: 96 % | HEART RATE: 89 BPM | RESPIRATION RATE: 18 BRPM

## 2025-07-25 DIAGNOSIS — Z29.9 ENCOUNTER FOR PROPHYLACTIC MEASURES, UNSPECIFIED: ICD-10-CM

## 2025-07-25 LAB
ALBUMIN SERPL ELPH-MCNC: 3.2 G/DL — LOW (ref 3.3–5)
ALP SERPL-CCNC: 104 U/L — SIGNIFICANT CHANGE UP (ref 40–120)
ALT FLD-CCNC: 8 U/L — LOW (ref 10–45)
ANION GAP SERPL CALC-SCNC: 10 MMOL/L — SIGNIFICANT CHANGE UP (ref 5–17)
AST SERPL-CCNC: 9 U/L — LOW (ref 10–40)
BASOPHILS # BLD AUTO: 0.07 K/UL — SIGNIFICANT CHANGE UP (ref 0–0.2)
BASOPHILS NFR BLD AUTO: 0.7 % — SIGNIFICANT CHANGE UP (ref 0–2)
BILIRUB SERPL-MCNC: <0.2 MG/DL — SIGNIFICANT CHANGE UP (ref 0.2–1.2)
BUN SERPL-MCNC: 12 MG/DL — SIGNIFICANT CHANGE UP (ref 7–23)
CALCIUM SERPL-MCNC: 8.2 MG/DL — LOW (ref 8.4–10.5)
CHLORIDE SERPL-SCNC: 110 MMOL/L — HIGH (ref 96–108)
CO2 SERPL-SCNC: 21 MMOL/L — LOW (ref 22–31)
CREAT SERPL-MCNC: 0.76 MG/DL — SIGNIFICANT CHANGE UP (ref 0.5–1.3)
EGFR: 92 ML/MIN/1.73M2 — SIGNIFICANT CHANGE UP
EGFR: 92 ML/MIN/1.73M2 — SIGNIFICANT CHANGE UP
EOSINOPHIL # BLD AUTO: 0.15 K/UL — SIGNIFICANT CHANGE UP (ref 0–0.5)
EOSINOPHIL NFR BLD AUTO: 1.4 % — SIGNIFICANT CHANGE UP (ref 0–6)
GLUCOSE SERPL-MCNC: 131 MG/DL — HIGH (ref 70–99)
GRAM STN FLD: SIGNIFICANT CHANGE UP
HCT VFR BLD CALC: 40.4 % — SIGNIFICANT CHANGE UP (ref 34.5–45)
HGB BLD-MCNC: 13 G/DL — SIGNIFICANT CHANGE UP (ref 11.5–15.5)
IMM GRANULOCYTES # BLD AUTO: 0.04 K/UL — SIGNIFICANT CHANGE UP (ref 0–0.07)
IMM GRANULOCYTES NFR BLD AUTO: 0.4 % — SIGNIFICANT CHANGE UP (ref 0–0.9)
LACTATE SERPL-SCNC: 1.1 MMOL/L — SIGNIFICANT CHANGE UP (ref 0.5–2)
LYMPHOCYTES # BLD AUTO: 3.29 K/UL — SIGNIFICANT CHANGE UP (ref 1–3.3)
LYMPHOCYTES NFR BLD AUTO: 30.9 % — SIGNIFICANT CHANGE UP (ref 13–44)
MAGNESIUM SERPL-MCNC: 2.4 MG/DL — SIGNIFICANT CHANGE UP (ref 1.6–2.6)
MCHC RBC-ENTMCNC: 30.6 PG — SIGNIFICANT CHANGE UP (ref 27–34)
MCHC RBC-ENTMCNC: 32.2 G/DL — SIGNIFICANT CHANGE UP (ref 32–36)
MCV RBC AUTO: 95.1 FL — SIGNIFICANT CHANGE UP (ref 80–100)
MONOCYTES # BLD AUTO: 0.77 K/UL — SIGNIFICANT CHANGE UP (ref 0–0.9)
MONOCYTES NFR BLD AUTO: 7.2 % — SIGNIFICANT CHANGE UP (ref 2–14)
MRSA PCR RESULT.: NEGATIVE — SIGNIFICANT CHANGE UP
NEUTROPHILS # BLD AUTO: 6.34 K/UL — SIGNIFICANT CHANGE UP (ref 1.8–7.4)
NEUTROPHILS NFR BLD AUTO: 59.4 % — SIGNIFICANT CHANGE UP (ref 43–77)
NRBC # BLD AUTO: 0 K/UL — SIGNIFICANT CHANGE UP (ref 0–0)
NRBC # FLD: 0 K/UL — SIGNIFICANT CHANGE UP (ref 0–0)
NRBC BLD AUTO-RTO: 0 /100 WBCS — SIGNIFICANT CHANGE UP (ref 0–0)
PHOSPHATE SERPL-MCNC: 2.7 MG/DL — SIGNIFICANT CHANGE UP (ref 2.5–4.5)
PLATELET # BLD AUTO: 264 K/UL — SIGNIFICANT CHANGE UP (ref 150–400)
PMV BLD: 10.2 FL — SIGNIFICANT CHANGE UP (ref 7–13)
POTASSIUM SERPL-MCNC: 4 MMOL/L — SIGNIFICANT CHANGE UP (ref 3.5–5.3)
POTASSIUM SERPL-SCNC: 4 MMOL/L — SIGNIFICANT CHANGE UP (ref 3.5–5.3)
PROT SERPL-MCNC: 5.8 G/DL — LOW (ref 6–8.3)
RBC # BLD: 4.25 M/UL — SIGNIFICANT CHANGE UP (ref 3.8–5.2)
RBC # FLD: 12.9 % — SIGNIFICANT CHANGE UP (ref 10.3–14.5)
S AUREUS DNA NOSE QL NAA+PROBE: NEGATIVE — SIGNIFICANT CHANGE UP
SODIUM SERPL-SCNC: 141 MMOL/L — SIGNIFICANT CHANGE UP (ref 135–145)
SPECIMEN SOURCE: SIGNIFICANT CHANGE UP
WBC # BLD: 10.66 K/UL — HIGH (ref 3.8–10.5)
WBC # FLD AUTO: 10.66 K/UL — HIGH (ref 3.8–10.5)

## 2025-07-25 PROCEDURE — 83605 ASSAY OF LACTIC ACID: CPT

## 2025-07-25 PROCEDURE — 73090 X-RAY EXAM OF FOREARM: CPT

## 2025-07-25 PROCEDURE — 87640 STAPH A DNA AMP PROBE: CPT

## 2025-07-25 PROCEDURE — 83735 ASSAY OF MAGNESIUM: CPT

## 2025-07-25 PROCEDURE — 87040 BLOOD CULTURE FOR BACTERIA: CPT

## 2025-07-25 PROCEDURE — 73110 X-RAY EXAM OF WRIST: CPT

## 2025-07-25 PROCEDURE — 84100 ASSAY OF PHOSPHORUS: CPT

## 2025-07-25 PROCEDURE — 99236 HOSP IP/OBS SAME DATE HI 85: CPT | Mod: GC

## 2025-07-25 PROCEDURE — 99285 EMERGENCY DEPT VISIT HI MDM: CPT | Mod: 25

## 2025-07-25 PROCEDURE — 96375 TX/PRO/DX INJ NEW DRUG ADDON: CPT

## 2025-07-25 PROCEDURE — 36415 COLL VENOUS BLD VENIPUNCTURE: CPT

## 2025-07-25 PROCEDURE — 90471 IMMUNIZATION ADMIN: CPT

## 2025-07-25 PROCEDURE — 96374 THER/PROPH/DIAG INJ IV PUSH: CPT

## 2025-07-25 PROCEDURE — 85025 COMPLETE CBC W/AUTO DIFF WBC: CPT

## 2025-07-25 PROCEDURE — 85730 THROMBOPLASTIN TIME PARTIAL: CPT

## 2025-07-25 PROCEDURE — 90715 TDAP VACCINE 7 YRS/> IM: CPT

## 2025-07-25 PROCEDURE — 87205 SMEAR GRAM STAIN: CPT

## 2025-07-25 PROCEDURE — 87075 CULTR BACTERIA EXCEPT BLOOD: CPT

## 2025-07-25 PROCEDURE — 85610 PROTHROMBIN TIME: CPT

## 2025-07-25 PROCEDURE — 87641 MR-STAPH DNA AMP PROBE: CPT

## 2025-07-25 PROCEDURE — 80053 COMPREHEN METABOLIC PANEL: CPT

## 2025-07-25 PROCEDURE — 87070 CULTURE OTHR SPECIMN AEROBIC: CPT

## 2025-07-25 RX ORDER — SULFAMETHOXAZOLE/TRIMETHOPRIM 800-160 MG
1 TABLET ORAL
Qty: 14 | Refills: 0
Start: 2025-07-25 | End: 2025-07-31

## 2025-07-25 RX ORDER — PIPERACILLIN-TAZO-DEXTROSE,ISO 3.375G/5
4.5 IV SOLUTION, PIGGYBACK PREMIX FROZEN(ML) INTRAVENOUS EVERY 8 HOURS
Refills: 0 | Status: DISCONTINUED | OUTPATIENT
Start: 2025-07-25 | End: 2025-07-25

## 2025-07-25 RX ORDER — METRONIDAZOLE 250 MG
1 TABLET ORAL
Qty: 21 | Refills: 0
Start: 2025-07-25 | End: 2025-07-31

## 2025-07-25 RX ORDER — VARENICLINE TARTRATE 0.5 MG/1
0 TABLET, FILM COATED ORAL
Refills: 0 | DISCHARGE

## 2025-07-25 RX ORDER — ENOXAPARIN SODIUM 100 MG/ML
30 INJECTION SUBCUTANEOUS EVERY 24 HOURS
Refills: 0 | Status: DISCONTINUED | OUTPATIENT
Start: 2025-07-25 | End: 2025-07-25

## 2025-07-25 RX ORDER — PIPERACILLIN-TAZO-DEXTROSE,ISO 3.375G/5
4.5 IV SOLUTION, PIGGYBACK PREMIX FROZEN(ML) INTRAVENOUS ONCE
Refills: 0 | Status: COMPLETED | OUTPATIENT
Start: 2025-07-25 | End: 2025-07-25

## 2025-07-25 RX ORDER — ENOXAPARIN SODIUM 100 MG/ML
40 INJECTION SUBCUTANEOUS EVERY 24 HOURS
Refills: 0 | Status: DISCONTINUED | OUTPATIENT
Start: 2025-07-26 | End: 2025-07-25

## 2025-07-25 RX ORDER — VANCOMYCIN HCL IN 5 % DEXTROSE 1.5G/250ML
750 PLASTIC BAG, INJECTION (ML) INTRAVENOUS EVERY 12 HOURS
Refills: 0 | Status: DISCONTINUED | OUTPATIENT
Start: 2025-07-25 | End: 2025-07-25

## 2025-07-25 RX ADMIN — Medication 200 GRAM(S): at 06:00

## 2025-07-25 RX ADMIN — Medication 166.67 MILLIGRAM(S): at 07:13

## 2025-07-25 RX ADMIN — Medication 25 GRAM(S): at 09:46

## 2025-07-25 RX ADMIN — ENOXAPARIN SODIUM 30 MILLIGRAM(S): 100 INJECTION SUBCUTANEOUS at 07:13

## 2025-07-29 LAB
CULTURE RESULTS: SIGNIFICANT CHANGE UP
CULTURE RESULTS: SIGNIFICANT CHANGE UP
SPECIMEN SOURCE: SIGNIFICANT CHANGE UP
SPECIMEN SOURCE: SIGNIFICANT CHANGE UP

## 2025-07-30 LAB
CULTURE RESULTS: SIGNIFICANT CHANGE UP
SPECIMEN SOURCE: SIGNIFICANT CHANGE UP

## 2025-07-31 DIAGNOSIS — S51.852A OPEN BITE OF LEFT FOREARM, INITIAL ENCOUNTER: ICD-10-CM

## 2025-07-31 DIAGNOSIS — B95.62 METHICILLIN RESISTANT STAPHYLOCOCCUS AUREUS INFECTION AS THE CAUSE OF DISEASES CLASSIFIED ELSEWHERE: ICD-10-CM
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
## 2025-07-31 DIAGNOSIS — L02.414 CUTANEOUS ABSCESS OF LEFT UPPER LIMB: ICD-10-CM
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
## 2025-07-31 DIAGNOSIS — F17.200 NICOTINE DEPENDENCE, UNSPECIFIED, UNCOMPLICATED: ICD-10-CM

## 2025-07-31 DIAGNOSIS — F41.8 OTHER SPECIFIED ANXIETY DISORDERS: ICD-10-CM
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
## 2025-07-31 DIAGNOSIS — F11.20 OPIOID DEPENDENCE, UNCOMPLICATED: ICD-10-CM
